# Patient Record
Sex: MALE | Race: ASIAN | NOT HISPANIC OR LATINO | URBAN - METROPOLITAN AREA
[De-identification: names, ages, dates, MRNs, and addresses within clinical notes are randomized per-mention and may not be internally consistent; named-entity substitution may affect disease eponyms.]

---

## 2024-04-23 ENCOUNTER — EMERGENCY (EMERGENCY)
Facility: HOSPITAL | Age: 64
LOS: 1 days | Discharge: HOME | End: 2024-04-23
Attending: STUDENT IN AN ORGANIZED HEALTH CARE EDUCATION/TRAINING PROGRAM | Admitting: EMERGENCY MEDICINE
Payer: COMMERCIAL

## 2024-04-23 VITALS
OXYGEN SATURATION: 99 % | RESPIRATION RATE: 16 BRPM | SYSTOLIC BLOOD PRESSURE: 152 MMHG | HEART RATE: 82 BPM | DIASTOLIC BLOOD PRESSURE: 79 MMHG | TEMPERATURE: 98 F

## 2024-04-23 LAB
A1C WITH ESTIMATED AVERAGE GLUCOSE RESULT: 14.1 % — HIGH (ref 4–5.6)
A1C WITH ESTIMATED AVERAGE GLUCOSE RESULT: 14.9 % — HIGH (ref 4–5.6)
ALBUMIN SERPL ELPH-MCNC: 4.2 G/DL — SIGNIFICANT CHANGE UP (ref 3.3–5)
ALP SERPL-CCNC: 126 U/L — HIGH (ref 40–120)
ALT FLD-CCNC: 34 U/L — SIGNIFICANT CHANGE UP (ref 4–41)
ANION GAP SERPL CALC-SCNC: 11 MMOL/L — SIGNIFICANT CHANGE UP (ref 7–14)
ANION GAP SERPL CALC-SCNC: 14 MMOL/L — SIGNIFICANT CHANGE UP (ref 7–14)
APPEARANCE UR: CLEAR — SIGNIFICANT CHANGE UP
AST SERPL-CCNC: 19 U/L — SIGNIFICANT CHANGE UP (ref 4–40)
B-OH-BUTYR SERPL-SCNC: <0 MMOL/L — SIGNIFICANT CHANGE UP (ref 0–0.4)
BACTERIA # UR AUTO: NEGATIVE /HPF — SIGNIFICANT CHANGE UP
BASE EXCESS BLDV CALC-SCNC: -0.5 MMOL/L — SIGNIFICANT CHANGE UP (ref -2–3)
BASOPHILS # BLD AUTO: 0.03 K/UL — SIGNIFICANT CHANGE UP (ref 0–0.2)
BASOPHILS NFR BLD AUTO: 0.5 % — SIGNIFICANT CHANGE UP (ref 0–2)
BILIRUB SERPL-MCNC: 0.3 MG/DL — SIGNIFICANT CHANGE UP (ref 0.2–1.2)
BILIRUB UR-MCNC: NEGATIVE — SIGNIFICANT CHANGE UP
BLOOD GAS VENOUS COMPREHENSIVE RESULT: SIGNIFICANT CHANGE UP
BUN SERPL-MCNC: 15 MG/DL — SIGNIFICANT CHANGE UP (ref 7–23)
BUN SERPL-MCNC: 17 MG/DL — SIGNIFICANT CHANGE UP (ref 7–23)
CALCIUM SERPL-MCNC: 8.3 MG/DL — LOW (ref 8.4–10.5)
CALCIUM SERPL-MCNC: 9.1 MG/DL — SIGNIFICANT CHANGE UP (ref 8.4–10.5)
CARBAMAZEPINE SERPL-MCNC: 6.4 UG/ML — SIGNIFICANT CHANGE UP (ref 4–12)
CAST: 0 /LPF — SIGNIFICANT CHANGE UP (ref 0–4)
CHLORIDE BLDV-SCNC: 99 MMOL/L — SIGNIFICANT CHANGE UP (ref 96–108)
CHLORIDE SERPL-SCNC: 100 MMOL/L — SIGNIFICANT CHANGE UP (ref 98–107)
CHLORIDE SERPL-SCNC: 94 MMOL/L — LOW (ref 98–107)
CO2 BLDV-SCNC: 28.3 MMOL/L — HIGH (ref 22–26)
CO2 SERPL-SCNC: 22 MMOL/L — SIGNIFICANT CHANGE UP (ref 22–31)
CO2 SERPL-SCNC: 23 MMOL/L — SIGNIFICANT CHANGE UP (ref 22–31)
COLOR SPEC: YELLOW — SIGNIFICANT CHANGE UP
CREAT SERPL-MCNC: 0.66 MG/DL — SIGNIFICANT CHANGE UP (ref 0.5–1.3)
CREAT SERPL-MCNC: 0.68 MG/DL — SIGNIFICANT CHANGE UP (ref 0.5–1.3)
DIFF PNL FLD: NEGATIVE — SIGNIFICANT CHANGE UP
EGFR: 104 ML/MIN/1.73M2 — SIGNIFICANT CHANGE UP
EGFR: 105 ML/MIN/1.73M2 — SIGNIFICANT CHANGE UP
EOSINOPHIL # BLD AUTO: 0.06 K/UL — SIGNIFICANT CHANGE UP (ref 0–0.5)
EOSINOPHIL NFR BLD AUTO: 1 % — SIGNIFICANT CHANGE UP (ref 0–6)
ESTIMATED AVERAGE GLUCOSE: 358 — SIGNIFICANT CHANGE UP
ESTIMATED AVERAGE GLUCOSE: 381 — SIGNIFICANT CHANGE UP
GAS PNL BLDV: 131 MMOL/L — LOW (ref 136–145)
GLUCOSE BLDV-MCNC: 276 MG/DL — HIGH (ref 70–99)
GLUCOSE SERPL-MCNC: 264 MG/DL — HIGH (ref 70–99)
GLUCOSE SERPL-MCNC: 406 MG/DL — HIGH (ref 70–99)
GLUCOSE UR QL: >=1000 MG/DL
HCO3 BLDV-SCNC: 27 MMOL/L — SIGNIFICANT CHANGE UP (ref 22–29)
HCT VFR BLD CALC: 44.1 % — SIGNIFICANT CHANGE UP (ref 39–50)
HCT VFR BLDA CALC: 41 % — SIGNIFICANT CHANGE UP (ref 39–51)
HGB BLD CALC-MCNC: 13.8 G/DL — SIGNIFICANT CHANGE UP (ref 12.6–17.4)
HGB BLD-MCNC: 14.6 G/DL — SIGNIFICANT CHANGE UP (ref 13–17)
IANC: 3.59 K/UL — SIGNIFICANT CHANGE UP (ref 1.8–7.4)
IMM GRANULOCYTES NFR BLD AUTO: 0.3 % — SIGNIFICANT CHANGE UP (ref 0–0.9)
KETONES UR-MCNC: NEGATIVE MG/DL — SIGNIFICANT CHANGE UP
LACTATE BLDV-MCNC: 1.5 MMOL/L — SIGNIFICANT CHANGE UP (ref 0.5–2)
LEUKOCYTE ESTERASE UR-ACNC: NEGATIVE — SIGNIFICANT CHANGE UP
LYMPHOCYTES # BLD AUTO: 1.93 K/UL — SIGNIFICANT CHANGE UP (ref 1–3.3)
LYMPHOCYTES # BLD AUTO: 32.7 % — SIGNIFICANT CHANGE UP (ref 13–44)
MCHC RBC-ENTMCNC: 28.5 PG — SIGNIFICANT CHANGE UP (ref 27–34)
MCHC RBC-ENTMCNC: 33.1 GM/DL — SIGNIFICANT CHANGE UP (ref 32–36)
MCV RBC AUTO: 86 FL — SIGNIFICANT CHANGE UP (ref 80–100)
MONOCYTES # BLD AUTO: 0.28 K/UL — SIGNIFICANT CHANGE UP (ref 0–0.9)
MONOCYTES NFR BLD AUTO: 4.7 % — SIGNIFICANT CHANGE UP (ref 2–14)
NEUTROPHILS # BLD AUTO: 3.59 K/UL — SIGNIFICANT CHANGE UP (ref 1.8–7.4)
NEUTROPHILS NFR BLD AUTO: 60.8 % — SIGNIFICANT CHANGE UP (ref 43–77)
NITRITE UR-MCNC: NEGATIVE — SIGNIFICANT CHANGE UP
NRBC # BLD: 0 /100 WBCS — SIGNIFICANT CHANGE UP (ref 0–0)
NRBC # FLD: 0.02 K/UL — HIGH (ref 0–0)
PCO2 BLDV: 53 MMHG — SIGNIFICANT CHANGE UP (ref 42–55)
PH BLDV: 7.31 — LOW (ref 7.32–7.43)
PH UR: 7 — SIGNIFICANT CHANGE UP (ref 5–8)
PLATELET # BLD AUTO: 298 K/UL — SIGNIFICANT CHANGE UP (ref 150–400)
PO2 BLDV: 23 MMHG — LOW (ref 25–45)
POTASSIUM BLDV-SCNC: 4.6 MMOL/L — SIGNIFICANT CHANGE UP (ref 3.5–5.1)
POTASSIUM SERPL-MCNC: 4.6 MMOL/L — SIGNIFICANT CHANGE UP (ref 3.5–5.3)
POTASSIUM SERPL-MCNC: 5.2 MMOL/L — SIGNIFICANT CHANGE UP (ref 3.5–5.3)
POTASSIUM SERPL-SCNC: 4.6 MMOL/L — SIGNIFICANT CHANGE UP (ref 3.5–5.3)
POTASSIUM SERPL-SCNC: 5.2 MMOL/L — SIGNIFICANT CHANGE UP (ref 3.5–5.3)
PROT SERPL-MCNC: 7.1 G/DL — SIGNIFICANT CHANGE UP (ref 6–8.3)
PROT UR-MCNC: NEGATIVE MG/DL — SIGNIFICANT CHANGE UP
RBC # BLD: 5.13 M/UL — SIGNIFICANT CHANGE UP (ref 4.2–5.8)
RBC # FLD: 11.4 % — SIGNIFICANT CHANGE UP (ref 10.3–14.5)
RBC CASTS # UR COMP ASSIST: 0 /HPF — SIGNIFICANT CHANGE UP (ref 0–4)
SAO2 % BLDV: 29.2 % — LOW (ref 67–88)
SODIUM SERPL-SCNC: 130 MMOL/L — LOW (ref 135–145)
SODIUM SERPL-SCNC: 134 MMOL/L — LOW (ref 135–145)
SP GR SPEC: 1.03 — HIGH (ref 1–1.03)
SQUAMOUS # UR AUTO: 0 /HPF — SIGNIFICANT CHANGE UP (ref 0–5)
T4 FREE SERPL-MCNC: 1 NG/DL — SIGNIFICANT CHANGE UP (ref 0.9–1.8)
TSH SERPL-MCNC: 1.2 UIU/ML — SIGNIFICANT CHANGE UP (ref 0.27–4.2)
UROBILINOGEN FLD QL: 0.2 MG/DL — SIGNIFICANT CHANGE UP (ref 0.2–1)
WBC # BLD: 5.91 K/UL — SIGNIFICANT CHANGE UP (ref 3.8–10.5)
WBC # FLD AUTO: 5.91 K/UL — SIGNIFICANT CHANGE UP (ref 3.8–10.5)
WBC UR QL: 0 /HPF — SIGNIFICANT CHANGE UP (ref 0–5)

## 2024-04-23 PROCEDURE — 70498 CT ANGIOGRAPHY NECK: CPT | Mod: 26,MC

## 2024-04-23 PROCEDURE — 93010 ELECTROCARDIOGRAM REPORT: CPT

## 2024-04-23 PROCEDURE — 99223 1ST HOSP IP/OBS HIGH 75: CPT

## 2024-04-23 PROCEDURE — 70496 CT ANGIOGRAPHY HEAD: CPT | Mod: 26,MC

## 2024-04-23 RX ORDER — SODIUM CHLORIDE 9 MG/ML
1000 INJECTION INTRAMUSCULAR; INTRAVENOUS; SUBCUTANEOUS ONCE
Refills: 0 | Status: COMPLETED | OUTPATIENT
Start: 2024-04-23 | End: 2024-04-23

## 2024-04-23 RX ORDER — INSULIN LISPRO 100/ML
5 VIAL (ML) SUBCUTANEOUS
Refills: 0 | Status: DISCONTINUED | OUTPATIENT
Start: 2024-04-24 | End: 2024-04-26

## 2024-04-23 RX ORDER — SODIUM CHLORIDE 9 MG/ML
1000 INJECTION, SOLUTION INTRAVENOUS
Refills: 0 | Status: DISCONTINUED | OUTPATIENT
Start: 2024-04-23 | End: 2024-04-26

## 2024-04-23 RX ORDER — SODIUM CHLORIDE 9 MG/ML
1000 INJECTION, SOLUTION INTRAVENOUS ONCE
Refills: 0 | Status: COMPLETED | OUTPATIENT
Start: 2024-04-23 | End: 2024-04-23

## 2024-04-23 RX ORDER — DEXTROSE 50 % IN WATER 50 %
12.5 SYRINGE (ML) INTRAVENOUS ONCE
Refills: 0 | Status: DISCONTINUED | OUTPATIENT
Start: 2024-04-23 | End: 2024-04-26

## 2024-04-23 RX ORDER — ACETAMINOPHEN 500 MG
650 TABLET ORAL ONCE
Refills: 0 | Status: COMPLETED | OUTPATIENT
Start: 2024-04-23 | End: 2024-04-23

## 2024-04-23 RX ORDER — GLUCAGON INJECTION, SOLUTION 0.5 MG/.1ML
1 INJECTION, SOLUTION SUBCUTANEOUS ONCE
Refills: 0 | Status: DISCONTINUED | OUTPATIENT
Start: 2024-04-23 | End: 2024-04-26

## 2024-04-23 RX ORDER — DEXTROSE 50 % IN WATER 50 %
25 SYRINGE (ML) INTRAVENOUS ONCE
Refills: 0 | Status: DISCONTINUED | OUTPATIENT
Start: 2024-04-23 | End: 2024-04-26

## 2024-04-23 RX ORDER — CARBAMAZEPINE 200 MG
400 TABLET ORAL DAILY
Refills: 0 | Status: DISCONTINUED | OUTPATIENT
Start: 2024-04-23 | End: 2024-04-26

## 2024-04-23 RX ORDER — INSULIN GLARGINE 100 [IU]/ML
21 INJECTION, SOLUTION SUBCUTANEOUS AT BEDTIME
Refills: 0 | Status: DISCONTINUED | OUTPATIENT
Start: 2024-04-23 | End: 2024-04-26

## 2024-04-23 RX ORDER — INSULIN LISPRO 100/ML
VIAL (ML) SUBCUTANEOUS
Refills: 0 | Status: DISCONTINUED | OUTPATIENT
Start: 2024-04-23 | End: 2024-04-26

## 2024-04-23 RX ORDER — MECLIZINE HCL 12.5 MG
25 TABLET ORAL EVERY 8 HOURS
Refills: 0 | Status: DISCONTINUED | OUTPATIENT
Start: 2024-04-23 | End: 2024-04-26

## 2024-04-23 RX ORDER — MECLIZINE HCL 12.5 MG
25 TABLET ORAL ONCE
Refills: 0 | Status: COMPLETED | OUTPATIENT
Start: 2024-04-23 | End: 2024-04-23

## 2024-04-23 RX ORDER — DEXTROSE 10 % IN WATER 10 %
125 INTRAVENOUS SOLUTION INTRAVENOUS ONCE
Refills: 0 | Status: DISCONTINUED | OUTPATIENT
Start: 2024-04-23 | End: 2024-04-26

## 2024-04-23 RX ORDER — DEXTROSE 50 % IN WATER 50 %
15 SYRINGE (ML) INTRAVENOUS ONCE
Refills: 0 | Status: DISCONTINUED | OUTPATIENT
Start: 2024-04-23 | End: 2024-04-26

## 2024-04-23 RX ADMIN — SODIUM CHLORIDE 1000 MILLILITER(S): 9 INJECTION, SOLUTION INTRAVENOUS at 21:31

## 2024-04-23 RX ADMIN — Medication 25 MILLIGRAM(S): at 12:13

## 2024-04-23 RX ADMIN — Medication 650 MILLIGRAM(S): at 12:13

## 2024-04-23 RX ADMIN — SODIUM CHLORIDE 1000 MILLILITER(S): 9 INJECTION INTRAMUSCULAR; INTRAVENOUS; SUBCUTANEOUS at 12:15

## 2024-04-23 RX ADMIN — SODIUM CHLORIDE 1000 MILLILITER(S): 9 INJECTION INTRAMUSCULAR; INTRAVENOUS; SUBCUTANEOUS at 14:00

## 2024-04-23 RX ADMIN — INSULIN GLARGINE 21 UNIT(S): 100 INJECTION, SOLUTION SUBCUTANEOUS at 22:12

## 2024-04-23 RX ADMIN — Medication 650 MILLIGRAM(S): at 13:52

## 2024-04-23 NOTE — ED CDU PROVIDER INITIAL DAY NOTE - ATTENDING APP SHARED VISIT CONTRIBUTION OF CARE
I have evaluated the patient and agree with the documentation and assessment as made by the PA. We have discussed plan of care and work up for the patient.    Exam:    GEN:  Non-toxic appearing, non-distressed, speaking full sentences, non-diaphoretic, AAOx3  HEENT:  NCAT, neck supple, EOMI, PERRLA, sclera anicteric, no conjunctival pallor or injection, no stridor, normal voice, no tonsillar exudate, uvula midline  CV:  regular rhythm and rate, s1/s2 audible, no murmurs, rubs or gallops, peripheral pulses 2+ and symmetric  PULM:  non-labored respirations, lungs clear to auscultation bilaterally, no wheezes, crackles or rales  ABD:  non distended, non-tender, no rebound, no guarding, negative Escalante's sign, bowel sounds normal, no cvat  MSK:  no gross deformity, non-tender extremities and joints, range of motion grossly normal appearing, no extremity edema, extremities warm and well perfused   NEURO:  AAOx3, CN II-XII intact, motor 5/5 in upper and lower extremities bilaterally, sensation grossly intact in extremities and trunk, finger to nose testing wnl, no nystagmus, negative Romberg, no pronator drift, no gait deficit  SKIN:  warm, dry, no rash or vesicles

## 2024-04-23 NOTE — ED ADULT TRIAGE NOTE - CHIEF COMPLAINT QUOTE
Pt history of seizure(takes Tegretol), presents for headache/dizziness starting yesterday, + strength/sensation to all 4 extremities, no weakness, denies chest pain, no shortness of breath, afebrile.

## 2024-04-23 NOTE — ED ADULT NURSE NOTE - CHIEF COMPLAINT
Needs a follow-up with Dr Isiah Islas has not been seen since Karena  The patient is a 64y Male complaining of

## 2024-04-23 NOTE — CONSULT NOTE ADULT - ATTENDING COMMENTS
Vertigo, isolated.  Neuro ROS is otherwise negative.  No corrective saccade with head impulse test.  No nystagmus.  No skew deviation.   No ataxia - FNF, KATERIN, HKS  Gait - normal heel, toe, tandem.    < from: MR Head No Cont (04.24.24 @ 01:35) >    IMPRESSION:  1.  No MRI evidence of acute intracranial pathology.  2.  Mild age-related involutional changes.  3.  Moderate patchy chronic microvascular ischemic disease.  4.  An empty versus partially empty sella is incidentally noted.  5.  Patchy paranasal sinus mucosal thickening without air-fluid level.    < end of copied text >        A/P  Mr. Hernandez is a 63 yo man with peripheral vertigo.  PRN meclizine  Outpatient neurology f/u - [phone].   [facility] vestibular rehabilitation - [phone]   Please provider educational resource: [email or web address]   Neurology signing off. Please reconsult PRN or call [facility] [phone] with any questions.   Thank you

## 2024-04-23 NOTE — ED PROVIDER NOTE - ATTENDING APP SHARED VISIT CONTRIBUTION OF CARE
I performed a history and physical exam of the patient and discussed their management with the resident/fellow/ACP/student. I have reviewed the resident/fellow/ACP/student note and agree with the documented findings and plan of care, except as noted. I have personally performed a substantive portion of the visit including all aspects of the medical decision making. My medical decision making and observations are found above. Please refer to any progress notes for updates on clinical course.    64-year-old male with past medical history of type 2 diabetes on metformin, seizure disorder (last seizure 3 months ago, compliant with medications currently on Tegretol) presenting with dizziness. Patient reports dizziness started around 11 AM yesterday while he was at work, worse with movement, associated posterior headache/neck pain. Dizziness worse with standing, cannot recall any fall/trauma.  Denies any change in vision/hearing, focal weakness, numbness/tingling, chest pain, shortness of breath, fever/chills, N/V/D, cough, rashes, or changes in urination.    Gen: WDWN, NAD, comfortable appearing, afebrile   HEENT: PERRLA, EOMI, no nasal discharge, mucous membranes moist  CV: RRR, +S1/S2, no M/R/G, equal b/l radial pulses 2+  Resp: CTAB, no W/R/R, SPO2 >95% on RA, no increased WOB   GI: Abdomen soft non-distended, NTTP, no masses/organomegaly   MSK/Skin: No CVA tenderness, no open wounds, no bruising, no LE edema  Neuro: CN2-12 grossly intact, A&Ox4, MS +5/5 in UE and LE BL, finger to nose smooth and rapid, gross sensation intact in UE and LE BL, gait smooth and coordinated, negative rhomberg, negative pronator drift   Psych: appropriate mood    MDM:   64-year-old male with past medical history of type 2 diabetes on metformin, seizure disorder (last seizure 3 months ago, compliant with medications currently on Tegretol) presenting with dizziness, Symptoms starting around 24 hours ago, no reported fall/trauma, no infectious symptoms, afebrile, no chest pain/shortness of breath, no FND on exam, no evidence of dysdiadochokinesia.  Exam/history concerning for but not limited to BPPV/peripheral vertigo versus low suspicion for central etiology of vertigo: Posterior CVA. Will evaluate with CTA head/neck, basic labs, symptom control, and reassess symptoms.  If patient persistently dizzy will consider keeping patient in observation unit for MRI

## 2024-04-23 NOTE — ED PROVIDER NOTE - PHYSICAL EXAMINATION
See Attestation See Attestation      CONSTITUTIONAL: Well-appearing; well-nourished; in no apparent distress. Non-toxic appearing.   HEAD: Normocephalic, atraumatic.  EYES: PERRL, EOM intact, conjunctiva and sclera WNL.  ENT: normal nose; no rhinorrhea; normal pharynx with no tonsillar hypertrophy, no erythema, no exudate, no lymphadenopathy.  NECK/LYMPH: Supple; non-tender; no cervical lymphadenopathy.  CARD: Normal S1, S2; no murmurs, rubs, or gallops noted.  RESP: Normal chest excursion with respiration; breath sounds clear and equal bilaterally; no wheezes, rhonchi, or rales.  ABD/GI: soft, non-distended; non-tender; no palpable organomegaly, no pulsatile mass.  EXT/MS: moves all extremities; distal pulses are normal, no pedal edema.  SKIN: Normal for age and race; warm; dry; good turgor; no apparent lesions or exudate noted.  NEURO: Awake, alert, oriented x 3, no gross deficits, CN II-XII grossly intact, no motor or sensory deficit noted. See Attestation      CONSTITUTIONAL: Well-appearing; well-nourished; in no apparent distress. Non-toxic appearing.   HEAD: Normocephalic, atraumatic.  EYES: PERRL, EOM intact, conjunctiva and sclera WNL.  ENT: normal nose; no rhinorrhea; normal pharynx with no tonsillar hypertrophy, no erythema, no exudate, no lymphadenopathy.  NECK/LYMPH: Supple; non-tender; no cervical lymphadenopathy.  CARD: Normal S1, S2; no murmurs, rubs, or gallops noted.  RESP: Normal chest excursion with respiration; breath sounds clear and equal bilaterally; no wheezes, rhonchi, or rales.  ABD/GI: soft, non-distended; non-tender; no palpable organomegaly, no pulsatile mass.  EXT/MS: moves all extremities; distal pulses are normal, no pedal edema.  SKIN: Normal for age and race; warm; dry; good turgor; no apparent lesions or exudate noted.  NEURO: Awake, alert, oriented x 3, no gross deficits, CN II-XII grossly intact, no motor or sensory deficit noted. FTN intact, no drift, ambulatory, negative rhomberg

## 2024-04-23 NOTE — ED ADULT NURSE NOTE - IN THE PAST 12 MONTHS HAVE YOU USED DRUGS OTHER THAN THOSE REQUIRED FOR MEDICAL REASON?
from [facility]  POD#2  plan for HD Sat 11/12   PT reccs: ANGELA   will likely return to [facility] once cleared by vascular   COVID - 11/8 No

## 2024-04-23 NOTE — ED CDU PROVIDER INITIAL DAY NOTE - OBJECTIVE STATEMENT
64-year-old male with past medical history of type 2 diabetes on metformin and 18 Units of insulin QHS, seizure disorder (last seizure 3 months ago, compliant with medications currently on Tegretol) presents to the ER complaining of giddiness, dizziness that began yesterday at 11 AM while he was at work, reported after he started to feel posterior head pain and neck pain. Dizziness worse with standing, but not with moving/ turning head. 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  Denies head strike or trauma.  Denies fevers,  chills, visual changes, hearing changes, nausea, vomiting, chest pain, shortness of breath, abdominal pain, urinary symptoms, weakness, numbness, tingling. Pt denies any other sx or acute complaints. Pt was seen by Neurology in the ED, recommends MRI for further eval, 64-year-old male with past medical history of type 2 diabetes on metformin and 18 Units of insulin QHS, seizure disorder (last seizure 3 months ago, compliant with medications currently on Tegretol) presents to the ER complaining of giddiness, dizziness that began yesterday at 11 AM while he was at work, reported after he started to feel posterior head pain and neck pain. Dizziness worse with standing, but not with moving/ turning head. 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  Denies head strike or trauma.  Denies fevers,  chills, visual changes, hearing changes, nausea, vomiting, chest pain, shortness of breath, abdominal pain, urinary symptoms, weakness, numbness, tingling. Pt denies any other sx or acute complaints. Pt was seen by Neurology in the ED, recommends MRI for further eval which was ordered. Endocrinology consulted due to uncontrolled DM. Pt has been stable while in CDU, no acute distress.

## 2024-04-23 NOTE — CONSULT NOTE ADULT - ASSESSMENT
Mr. Hernandez is a 64 year-old RH man with a PMH of DM2 and reported seizure disorder (on Tegretol 500mg qd) who presented to the ED on 4/23/24 with c/o intermittent dizziness of 2 weeks duration, described as both room spinning and instability, exacerbated by positional changes, for which Neurology was consulted. Vital signs notable for /79 on presentation, otherwise wnl. On evaluation, patient reports provocation of dizziness when moving from lying to sitting and standing. Exam without localizing deficits. Initial labs notable for Na+ 130. CTH/CTA with atretic R vertebral artery but no evidence of acute intracranial pathology or flow limiting stenosis or occlusion.   NIHSS: 0  pre-MRS: 0    Impression: Acute to subacute vestibular syndrome likely peripheral in etiology, possibly secondary to BPPV vs due to toxic-metabolic causes (eg hyponatremia, carbamazepine). Rule out central etiology such as a posterior circulation stroke.     Recommendations:   [] MRI brain w/o contrast  [] Meclizine 12.5mg to 25mg BID to TID prn; avoid prolonged use of vestibular suppressants  [] Can also try clonazepam 0.5mg BID to TID or valium 5mg BID if no improvement and no contraindication  [] Ensure adequate fluid intake  [] Replace electrolytes as appropriate  [] Check orthostatics, EKG  [] Check carbamazepine level  [] HbA1C and Lipid Panel  [] UA, TSH/fT4  [] Further infectious/toxic-metabolic workup as per primary team    Other:  [] Telemetry monitoring, Neurochecks/VS per unit protocol  [] Normotension  [] BG goal <180, avoid hypoglycemia  [] NPO until clears dysphagia screen, otherwise swallow evaluation  [] Fall precautions  [] DVT ppx      Patient will be formally staffed and seen with neurology attending during AM rounds.

## 2024-04-23 NOTE — ED PROVIDER NOTE - OBJECTIVE STATEMENT
64-year-old male with past medical history of type 2 diabetes on metformin, seizure disorder (last seizure 3 months ago, compliant with medications currently on Tegretol) presents to the ER complaining of giddiness, dizziness that began yesterday at 11 AM while he was at work, reported after he started to feel posterior head pain and neck pain. Dizziness worse with standing, but not with moving/ turning head. 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  Denies head strike or trauma.  Denies fevers,  chills, visual changes, hearing changes, nausea, vomiting, chest pain, shortness of breath, abdominal pain, urinary symptoms, weakness, numbness, tingling.

## 2024-04-23 NOTE — ED ADULT NURSE NOTE - OBJECTIVE STATEMENT
ER pt coming with 2 days of dizziness and Occipital area HA, denies SOB, no CP, no blur vision, pt is AOx 3 ambulatory to BR, Hx. seizures, on CM with NSR, lungs clear, resp. 18-20b/min, Oxygen Sat. 99% RA,    Labs sent IV to right AC 20g angio cath place.      Kenia Reyes RN (facilitator)

## 2024-04-23 NOTE — ED CDU PROVIDER INITIAL DAY NOTE - CLINICAL SUMMARY MEDICAL DECISION MAKING FREE TEXT BOX
64-year-old male with past medical history of type 2 diabetes on metformin and 18 Units of insulin QHS, seizure disorder (last seizure 3 months ago, compliant with medications currently on Tegretol) presents to the ER complaining of giddiness, dizziness that began yesterday at 11 AM while he was at work, reported after he started to feel posterior head pain and neck pain. Dizziness worse with standing, but not with moving/ turning head. 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 64-year-old male with past medical history of type 2 diabetes on metformin and 18 Units of insulin QHS, seizure disorder (last seizure 3 months ago, compliant with medications currently on Tegretol) presents to the ER complaining of giddiness, dizziness that began yesterday at 11 AM while he was at work, reported after he started to feel posterior head pain and neck pain. Dizziness worse with standing, but not with moving/ turning head.  Plan is CDU placement for glycemic control, MRI for further characterization of dizz and to R/O CVA or other central etiology, reassessment.

## 2024-04-23 NOTE — ED PROVIDER NOTE - CLINICAL SUMMARY MEDICAL DECISION MAKING FREE TEXT BOX
64-year-old male with past medical history of type 2 diabetes on metformin, seizure disorder (last seizure 3 months ago, compliant with medications currently on Tegretol) presents to the ER complaining of giddiness, dizziness that began yesterday at 11 AM while he was at work, reported after he started to feel posterior head pain and neck pain. Dizziness worse with standing, but not with moving/ turning head. 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  Denies head strike or trauma.  Denies fevers,  chills, visual changes, hearing changes, nausea, vomiting, chest pain, shortness of breath, abdominal pain, urinary symptoms, weakness, numbness, tingling.     most likely consistent with BPP vertigo, cervicogenic pain.   will get CTA head and neck eval posterior circ.   treat with fluids/ meclizine/ tylenol reassess. 64-year-old male with past medical history of type 2 diabetes on metformin, seizure disorder (last seizure 3 months ago, compliant with medications currently on Tegretol) presents to the ER complaining of giddiness, dizziness that began yesterday at 11 AM while he was at work, reported after he started to feel posterior head pain and neck pain. Dizziness worse with standing, but not with moving/ turning head. 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  Denies head strike or trauma.  Denies fevers,  chills, visual changes, hearing changes, nausea, vomiting, chest pain, shortness of breath, abdominal pain, urinary symptoms, weakness, numbness, tingling.     most likely consistent with BPP vertigo, cervicogenic pain.   will get CTA head and neck eval posterior circ.   treat with fluids/ meclizine/ tylenol reassess.    Deo, Attending  See Attestation

## 2024-04-23 NOTE — ED ADULT NURSE NOTE - NSFALLUNIVINTERV_ED_ALL_ED
Bed/Stretcher in lowest position, wheels locked, appropriate side rails in place/Call bell, personal items and telephone in reach/Instruct patient to call for assistance before getting out of bed/chair/stretcher/Non-slip footwear applied when patient is off stretcher/[place] to call system/Physically safe environment - no spills, clutter or unnecessary equipment/Purposeful proactive rounding/Room/bathroom lighting operational, light cord in reach

## 2024-04-23 NOTE — CONSULT NOTE ADULT - SUBJECTIVE AND OBJECTIVE BOX
Neurology Consult    JULISSA HERNANDEZ  64y Male  MRN-[id number]      HPI:    Mr. Hernandez is a 64 year-old RH man with a PMH of DM2 and reported seizure disorder (on Tegretol 500mg qd) who presented to the ED on 4/23/24 with c/o dizziness of 2 weeks duration, for which Neurology was consulted. Patient reports dizziness has been intermittent during this time, but was most severe the day prior to presentation. It is described as a sensation of feeling "shaky" and as though things are "moving around" him. He is uncertain of clear provoking factors, but reports worsening symptoms when he moves from laying to standing/ambulating and when improvement/resolution when laying down. He reports associated nausea, but has not experienced any vomiting. He does not report associated diplopia, dysarthria, tinnitus, numbness, tingling, or weakness of the extremities. He has not sustained any recent injuries or falls. No recent fever, chills or illness. At baseline he ambulates without assistance and is independent with ADLs.       A 10-system ROS was performed and is negative except as noted above and/or in the HPI.      PAST MEDICAL & SURGICAL HISTORY:  Diabetes      History of seizure      FAMILY HISTORY:  No reported pertinent history.      SOCIAL HISTORY:   No tobacco or alcohol use.   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    MEDICATIONS    Home Medications:    MEDICATIONS  (STANDING):  meclizine 25 milliGRAM(s) Oral Once    MEDICATIONS  (PRN):      Allergies  No Known Allergies        VITALS & EXAMINATION      Vital Signs Last 24 Hrs  T(C): 36.1 (23 Apr 2024 17:16), Max: 36.8 (23 Apr 2024 10:26)  T(F): 97 (23 Apr 2024 17:16), Max: 98.2 (23 Apr 2024 10:26)  HR: 85 (23 Apr 2024 17:16) (82 - 85)  BP: 158/91 (23 Apr 2024 17:16) (152/79 - 158/91)  RR: 16 (23 Apr 2024 17:16) (16 - 16)  SpO2: 100% (23 Apr 2024 17:16) (99% - 100%)    Parameters below as of 23 Apr 2024 10:26  Patient On (Oxygen Delivery Method): room air    General:  Constitutional: appears stated age, NAD.  Head: Normocephalic & atraumatic.  Respiratory: Breathing comfortably on room air.  Extremities: No cyanosis, clubbing, or edema.    Neurological (>12):  MS: Eyes open, alert, oriented to person, place, situation, time. Attn/Conc intact. Recent and remote memory intact. Fund of knowledge wnl. Follows all commands.    Speech/Language: Clear, fluent with good repetition. Naming intact.    CNs: PERRL (3 -> 2mm OU). Blink to threat intact b/l. EOMI, no nystagmus, no vertical skew, no diplopia. V1-3 intact to LT, well developed masseter muscles b/l. No facial asymmetry b/l, full eye closure strength b/l. Hearing grossly intact (rubbing fingers) b/l. Symmetric palate elevation in midline, no uvula deviation. Head turning & shoulder shrug intact b/l. Tongue midline on protrusion with full lateral movements.    Motor: Muscle bulk and tone are normal.  No tremor at rest. There is no pronator drift or satelliting on arm roll.    RUE/LUE: 5/5 shoulder abductors/, 5/5 elbow flexors/extensors, 5/5 .      RLE/LLE: 5/5 hip flexors, 5/5 knee flexors/extensors, 5/5 ankle dorsiflexors and plantarflexors.    Sensation: Intact to LT b/l throughout. Normal Romberg.     Reflexes: 2+ biceps, brachioradialis, triceps, patellars and achilles b/l.    Coordination: No dysmetria to FTN/HTS b/l.    Gait: Normal base, able to ambulate independently despite symptoms.        LABS:    CBC                       14.6   5.91  )-----------( 298      ( 23 Apr 2024 11:50 )             44.1     Chem 04-23    134<L>  |  100  |  15  ----------------------------<  264<H>  4.6   |  23  |  0.68    Ca    8.3<L>      23 Apr 2024 14:22    TPro  7.1  /  Alb  4.2  /  TBili  0.3  /  DBili  x   /  AST  19  /  ALT  34  /  AlkPhos  126<H>  04-23    Coags   LFTs LIVER FUNCTIONS - ( 23 Apr 2024 11:50 )  Alb: 4.2 g/dL / Pro: 7.1 g/dL / ALK PHOS: 126 U/L / ALT: 34 U/L / AST: 19 U/L / GGT: x           Lipid Panel   A1c   Cardiac enzymes     U/A Urinalysis Basic - ( 23 Apr 2024 14:22 )  Color: x / Appearance: x / SG: x / pH: x  Gluc: 264 mg/dL / Ketone: x  / Bili: x / Urobili: x   Blood: x / Protein: x / Nitrite: x   Leuk Esterase: x / RBC: x / WBC x   Sq Epi: x / Non Sq Epi: x / Bacteria: x        STUDIES & IMAGING    CT Angio Head/Neck w/ IV Cont (04.23.24 @ 12:54)  Parenchymal volume loss and chronic microvessel ischemic changes are   identified    There is no acute hemorrhage mass or mass effect seen.    Both distal common carotid, proximal internal and external carotid   arteries appear normal without significant stenosis. Atretic right   vertebral artery seen. Both vertebral arteries demonstrate enhancement.   Hypoplastic left A1 segment is seen. Elevation of the anterior cerebral   middle cerebral basilar and posterior cerebral arteries appear normal   without evidence of an aneurysm or significant stenosis.    The dural venous sinuses demonstrate normal enhancement.    Evaluation of the soft tissue neck region appears normal.    The visualized lung apices appear clear.    IMPRESSION: No evidence acute hemorrhage mass effect.    CTA of the neck demonstrates atretic right vertebral artery    CTA of the [place] demonstrates no evidence of aneurysms or   significant stenosis.    --- End of Report ---

## 2024-04-24 VITALS
TEMPERATURE: 98 F | DIASTOLIC BLOOD PRESSURE: 76 MMHG | RESPIRATION RATE: 16 BRPM | OXYGEN SATURATION: 97 % | SYSTOLIC BLOOD PRESSURE: 130 MMHG | HEART RATE: 89 BPM

## 2024-04-24 DIAGNOSIS — E78.5 HYPERLIPIDEMIA, UNSPECIFIED: ICD-10-CM

## 2024-04-24 DIAGNOSIS — I10 ESSENTIAL (PRIMARY) HYPERTENSION: ICD-10-CM

## 2024-04-24 DIAGNOSIS — E11.9 TYPE 2 DIABETES MELLITUS WITHOUT COMPLICATIONS: ICD-10-CM

## 2024-04-24 LAB
ALBUMIN SERPL ELPH-MCNC: 3.8 G/DL — SIGNIFICANT CHANGE UP (ref 3.3–5)
ALP SERPL-CCNC: 99 U/L — SIGNIFICANT CHANGE UP (ref 40–120)
ALT FLD-CCNC: 32 U/L — SIGNIFICANT CHANGE UP (ref 4–41)
ANION GAP SERPL CALC-SCNC: 14 MMOL/L — SIGNIFICANT CHANGE UP (ref 7–14)
AST SERPL-CCNC: 26 U/L — SIGNIFICANT CHANGE UP (ref 4–40)
BILIRUB SERPL-MCNC: 0.2 MG/DL — SIGNIFICANT CHANGE UP (ref 0.2–1.2)
BUN SERPL-MCNC: 16 MG/DL — SIGNIFICANT CHANGE UP (ref 7–23)
CALCIUM SERPL-MCNC: 8.8 MG/DL — SIGNIFICANT CHANGE UP (ref 8.4–10.5)
CHLORIDE SERPL-SCNC: 100 MMOL/L — SIGNIFICANT CHANGE UP (ref 98–107)
CO2 SERPL-SCNC: 21 MMOL/L — LOW (ref 22–31)
CREAT SERPL-MCNC: 0.64 MG/DL — SIGNIFICANT CHANGE UP (ref 0.5–1.3)
EGFR: 106 ML/MIN/1.73M2 — SIGNIFICANT CHANGE UP
GLUCOSE SERPL-MCNC: 262 MG/DL — HIGH (ref 70–99)
POTASSIUM SERPL-MCNC: 4.6 MMOL/L — SIGNIFICANT CHANGE UP (ref 3.5–5.3)
POTASSIUM SERPL-SCNC: 4.6 MMOL/L — SIGNIFICANT CHANGE UP (ref 3.5–5.3)
PROT SERPL-MCNC: 6.8 G/DL — SIGNIFICANT CHANGE UP (ref 6–8.3)
SODIUM SERPL-SCNC: 135 MMOL/L — SIGNIFICANT CHANGE UP (ref 135–145)

## 2024-04-24 PROCEDURE — 99238 HOSP IP/OBS DSCHRG MGMT 30/<: CPT

## 2024-04-24 PROCEDURE — 99283 EMERGENCY DEPT VISIT LOW MDM: CPT

## 2024-04-24 PROCEDURE — 99205 OFFICE O/P NEW HI 60 MIN: CPT | Mod: GC

## 2024-04-24 PROCEDURE — 70551 MRI BRAIN STEM W/O DYE: CPT | Mod: 26,MC

## 2024-04-24 RX ORDER — METFORMIN HYDROCHLORIDE 850 MG/1
1 TABLET ORAL
Qty: 120 | Refills: 0
Start: 2024-04-24 | End: 2024-06-22

## 2024-04-24 RX ORDER — INSULIN GLARGINE 100 [IU]/ML
15 INJECTION, SOLUTION SUBCUTANEOUS
Qty: 5 | Refills: 0
Start: 2024-04-24 | End: 2024-05-24

## 2024-04-24 RX ORDER — INSULIN GLARGINE 100 [IU]/ML
15 INJECTION, SOLUTION SUBCUTANEOUS
Qty: 5 | Refills: 0
Start: 2024-04-24 | End: 2024-05-23

## 2024-04-24 RX ORDER — ISOPROPYL ALCOHOL, BENZOCAINE .7; .06 ML/ML; ML/ML
0 SWAB TOPICAL
Qty: 100 | Refills: 1
Start: 2024-04-24

## 2024-04-24 RX ORDER — INSULIN LISPRO 100/ML
5 VIAL (ML) SUBCUTANEOUS
Qty: 5 | Refills: 0
Start: 2024-04-24 | End: 2024-05-23

## 2024-04-24 RX ADMIN — Medication 1: at 11:52

## 2024-04-24 RX ADMIN — Medication 5 UNIT(S): at 16:35

## 2024-04-24 RX ADMIN — Medication 5 UNIT(S): at 07:50

## 2024-04-24 RX ADMIN — Medication 2: at 07:51

## 2024-04-24 RX ADMIN — Medication 5 UNIT(S): at 11:52

## 2024-04-24 RX ADMIN — Medication 400 MILLIGRAM(S): at 12:02

## 2024-04-24 RX ADMIN — Medication 2: at 16:34

## 2024-04-24 NOTE — ED CDU PROVIDER DISPOSITION NOTE - NSFOLLOWUPINSTRUCTIONS_ED_ALL_ED_FT
Follow up with your PMD within 48-72 hrs or our clinic [phone].   Follow up with Endocrinology within the week.   [facility]: [address] [place] [address]. [address]. [place] [address]. Tel: [phone].  Call [phone] for appt.    Show copies of your reports given to you.     Start Lantus 15 units inject subcutaneously at bedtime.   Humalog 5 units inject before meals.      STOP glipizide-metformin and replace with metformin 1000mg BID  STOP victoza 1.8 mg sq daily for now     Please follow up outpatient with neurology.   You can call [phone] to make an appointment.   [facility] for vestibular rehabilitation - [phone]    Monitor you finger sticks 4x/day- before meals and at bedtime and record in a log book.    Bring that log book with you when you follow up with Endocrinology.    Blood sugar less than 60 or greater than 300 you should return to the ED.    Weakness, chest pain, nausea, vomiting, fever, chills, abdominal pain or ANY NEW CONCERNING SYMPTOMS return to the Emergency Department.       DIZZINESS, IMBALANCE AND HEARING DISORDERS  Can Land MD    Please read our disclaimer • Return to index. •Page last modified: July 1, 2023  This list is not always current and we advise using the search tool on this page and all of the pages on this site. The search tool is confined to this site, it doesn't have advertisements, and it has no bias regarding the position of pages.    Roller coaster Dizziness -- broadly  An overview of dizziness  Evaluation of the dizzy patient (for clinicians)  Dizziness in groups  Dizziness in the elderly  Children  Pilots  Unlocalized dizziness and ataxia  Mechanism of BPPV Dizziness or vertigo caused by inner ear problems  Benign Paroxysmal Positional Vertigo (BPPV)  Posterior canal BPPV  Lateral Canal BPPV  Controversies  Attila-Hallpike test  Recent articles with comments  Meniere's Disease  Epidemiology (distribution in the population)  Causes  diet  last_resort treatment  Alternative Treatments, placebo treatments.  Infections  HIV-1 infection  Otic tuberculosis  Otosyphilis  West Nile  Vestibular Neuritis and Labyrinthitis  Bilateral Vestibular Loss (including Gentamicin toxicity)  Causes of bilateral loss  Ototoxic drugs  Ototoxic ear drops  Prevention  Testing for bilateral vestibular loss  Pressure Sensitivity  Perilymph Fistula  Superior Canal Dehiscence  Tumors and other masses  Acoustic Neuroma  Glomus Tumor  Meningioma  subarachnoid cyst  Microvascular compression syndrome  Utricular Dysfunction     Brain Dizziness caused by Neurological Problems  Basilar invagination  Brainstem strokes (pathophysiology and presentation)  B12 deficiency  Cerebellar degenerations  Cervical vertigo  Chiari Malformation  Epilepsy  Lithium toxicity.  Low CSF pressure  Mal de debarquement (MDD)  Migraine associated vertigo  Motion sickness  Multiple Sclerosis (MS)  Parkinsonism and related disorders  Periventricular white matter lesions  Primary Orthostatic Tremor (POT)  Oculopalatal myoclonus  Opsoclonus  Stroke and Transient Ischemic Attack (diagnosis and treatment)  BP Cuff  Dizziness caused by low blood pressure, medication, intoxications, or chemicals  Alcohol intoxication  autonomic neuropathy  Medication induced dizziness  Orthostatic Hypotension  Positional orthostatic tachycardia (POTS)  Solvents  Syncope  Audiogram Hearing problems  Hearing loss (general discussion)  Autoimmune inner ear disease  Auditory neuropathy and carbon monoxide hearing loss  Acoustic Neuroma, cholesteatoma and Glomus tumor (see tumors page for others)  Conductive hearing loss, sensorineural hearing loss, noise induced hearing loss, central hearing loss, and presbyacusis (age related)  Low frequency sensorineural hearing loss and cookie-bite hearing loss  Menieres Disease  Otosclerosis  Otitis media and Otitis externa  Sudden hearing loss and Progressive bilateral hearing loss  Superficial siderosis  Tinnitus and Hyperacusis  Ear Wax  Dry or itchy ears  Miscellaneous -- mainly situational or particular symptoms of dizziness  Postraumatic syndromes  airbag deployment  Concussion  Ferguson syndrome  Inversion tables  Lightning and electrical injuries  Post-traumatic hearing loss  Post-traumatic Vertigo  Temporal bone fracture  Postpartum (pregnancy related) vertigo  PPPD (Persistent postural perceptual dizziness, a functional dizziness)  Radiation to ear  whiplash  Events or disorders with a tenuous connection to dizziness  Amyloidosis and dizziness/hearing  TMJ (temporomandibular joint) induced symptoms  Fibromyalgia  Dizziness associated with various activities  Dental work  Drinking alcohol  Exercise induced dizziness  Flying  Motion sickness  Positional vertigo  Pressure sensitivity  Sexual activity  Sound sensitivity (Tullio's)  Visual dependence and visual sensitivity (grocery store syndrome)  Symptoms  Rocking  Drop attacks  Fluctuations  Inversion illusions and Palinopsia (repeated images)  Room tilt illusions  Epley Maneuver Treatment of Vertigo  Medical treatment  For inner ear disorders (such as vestibular neuritis or Meniere's)  For central disorders (such as stroke or brain tumor)  Surgical Treatment (general -- there are a few operations)  Intratympanic gentamicin  Intratympanic steroids  Pollo Chi for balance   Vestibular Rehabilitation  Practice related material for the dizzy clinician  Bedside recording of eye movements  Drug treatment of dizziness  Evaluation of the dizzy patient  Eye movement testing Follow up with your PMD within 48-72 hrs or our clinic [phone].   Follow up with Endocrinology within the week.   [facility]: [address] [place] [address]. [address]. [place] [address]. Tel: [phone].  Call [phone] for appt.    Show copies of your reports given to you.     Start Lantus 15 units inject subcutaneously at bedtime.   Humalog 5 units inject before meals.    Metformin 1000mg twice a day.    STOP glipizide-metformin and replace with metformin 1000mg BID  STOP victoza 1.8 mg sq daily for now     Please follow up outpatient with neurology.   You can call [phone] to make an appointment.   [facility] for vestibular rehabilitation - [phone]    Monitor you finger sticks 4x/day- before meals and at bedtime and record in a log book.    Bring that log book with you when you follow up with Endocrinology.    Blood sugar less than 60 or greater than 300 you should return to the ED.    Weakness, chest pain, nausea, vomiting, fever, chills, abdominal pain or ANY NEW CONCERNING SYMPTOMS return to the Emergency Department.       DIZZINESS, IMBALANCE AND HEARING DISORDERS  Can Land MD    Please read our disclaimer • Return to index. •Page last modified: July 1, 2023  This list is not always current and we advise using the search tool on this page and all of the pages on this site. The search tool is confined to this site, it doesn't have advertisements, and it has no bias regarding the position of pages.    Roller coaster Dizziness -- broadly  An overview of dizziness  Evaluation of the dizzy patient (for clinicians)  Dizziness in groups  Dizziness in the elderly  Children  Pilots  Unlocalized dizziness and ataxia  Mechanism of BPPV Dizziness or vertigo caused by inner ear problems  Benign Paroxysmal Positional Vertigo (BPPV)  Posterior canal BPPV  Lateral Canal BPPV  Controversies  Attila-Hallpike test  Recent articles with comments  Meniere's Disease  Epidemiology (distribution in the population)  Causes  diet  last_resort treatment  Alternative Treatments, placebo treatments.  Infections  HIV-1 infection  Otic tuberculosis  Otosyphilis  West Nile  Vestibular Neuritis and Labyrinthitis  Bilateral Vestibular Loss (including Gentamicin toxicity)  Causes of bilateral loss  Ototoxic drugs  Ototoxic ear drops  Prevention  Testing for bilateral vestibular loss  Pressure Sensitivity  Perilymph Fistula  Superior Canal Dehiscence  Tumors and other masses  Acoustic Neuroma  Glomus Tumor  Meningioma  subarachnoid cyst  Microvascular compression syndrome  Utricular Dysfunction     Brain Dizziness caused by Neurological Problems  Basilar invagination  Brainstem strokes (pathophysiology and presentation)  B12 deficiency  Cerebellar degenerations  Cervical vertigo  Chiari Malformation  Epilepsy  Lithium toxicity.  Low CSF pressure  Mal de debarquement (MDD)  Migraine associated vertigo  Motion sickness  Multiple Sclerosis (MS)  Parkinsonism and related disorders  Periventricular white matter lesions  Primary Orthostatic Tremor (POT)  Oculopalatal myoclonus  Opsoclonus  Stroke and Transient Ischemic Attack (diagnosis and treatment)  BP Cuff  Dizziness caused by low blood pressure, medication, intoxications, or chemicals  Alcohol intoxication  autonomic neuropathy  Medication induced dizziness  Orthostatic Hypotension  Positional orthostatic tachycardia (POTS)  Solvents  Syncope  Audiogram Hearing problems  Hearing loss (general discussion)  Autoimmune inner ear disease  Auditory neuropathy and carbon monoxide hearing loss  Acoustic Neuroma, cholesteatoma and Glomus tumor (see tumors page for others)  Conductive hearing loss, sensorineural hearing loss, noise induced hearing loss, central hearing loss, and presbyacusis (age related)  Low frequency sensorineural hearing loss and cookie-bite hearing loss  Menieres Disease  Otosclerosis  Otitis media and Otitis externa  Sudden hearing loss and Progressive bilateral hearing loss  Superficial siderosis  Tinnitus and Hyperacusis  Ear Wax  Dry or itchy ears  Miscellaneous -- mainly situational or particular symptoms of dizziness  Postraumatic syndromes  airbag deployment  Concussion  Lindale syndrome  Inversion tables  Lightning and electrical injuries  Post-traumatic hearing loss  Post-traumatic Vertigo  Temporal bone fracture  Postpartum (pregnancy related) vertigo  PPPD (Persistent postural perceptual dizziness, a functional dizziness)  Radiation to ear  whiplash  Events or disorders with a tenuous connection to dizziness  Amyloidosis and dizziness/hearing  TMJ (temporomandibular joint) induced symptoms  Fibromyalgia  Dizziness associated with various activities  Dental work  Drinking alcohol  Exercise induced dizziness  Flying  Motion sickness  Positional vertigo  Pressure sensitivity  Sexual activity  Sound sensitivity (Tullio's)  Visual dependence and visual sensitivity (grocery store syndrome)  Symptoms  Rocking  Drop attacks  Fluctuations  Inversion illusions and Palinopsia (repeated images)  Room tilt illusions  Epley Maneuver Treatment of Vertigo  Medical treatment  For inner ear disorders (such as vestibular neuritis or Meniere's)  For central disorders (such as stroke or brain tumor)  Surgical Treatment (general -- there are a few operations)  Intratympanic gentamicin  Intratympanic steroids  Pollo Chi for balance   Vestibular Rehabilitation  Practice related material for the dizzy clinician  Bedside recording of eye movements  Drug treatment of dizziness  Evaluation of the dizzy patient  Eye movement testing

## 2024-04-24 NOTE — ED ADULT NURSE REASSESSMENT NOTE - NS ED NURSE REASSESS COMMENT FT1
Break coverage RN: Received patient in CDU bed 7. Patient denies any pain/discomfort at this time. Patient is A&OX4, airway patent, breathing unlabored and even, radial pulses palpable. Side rails up and safety maintained. Call bells within reach. Break coverage RN: Received patient in CDU bed 7. Patient denies any pain or discomfort at this time. Patient is A&OX4, airway patent, breathing unlabored and even, radial pulses palpable. Side rails up and safety maintained. Call bells within reach.

## 2024-04-24 NOTE — ED CDU PROVIDER SUBSEQUENT DAY NOTE - CLINICAL SUMMARY MEDICAL DECISION MAKING FREE TEXT BOX
64-year-old male with past medical history of type 2 diabetes on metformin and 18 Units of insulin QHS, seizure disorder (last seizure 3 months ago, compliant with medications currently on Tegretol) presents to the ER complaining of giddiness, dizziness that began yesterday at 11 AM while he was at work, reported after he started to feel posterior head pain and neck pain. Dizziness worse with standing, but not with moving/ turning head.  Plan is CDU placement for glycemic control, MRI for further characterization of dizz and to R/O CVA or other central etiology, reassessment. Pt has been stable while in CDU, will continue to monitor.

## 2024-04-24 NOTE — ED CDU PROVIDER SUBSEQUENT DAY NOTE - HISTORY
64-year-old male with past medical history of type 2 diabetes on metformin and 18 Units of insulin QHS, seizure disorder (last seizure 3 months ago, compliant with medications currently on Tegretol) presents to the ER complaining of giddiness, dizziness that began yesterday at 11 AM while he was at work, reported after he started to feel posterior head pain and neck pain. Dizziness worse with standing, but not with moving/ turning head. 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  Denies head strike or trauma.  Denies fevers,  chills, visual changes, hearing changes, nausea, vomiting, chest pain, shortness of breath, abdominal pain, urinary symptoms, weakness, numbness, tingling. Pt denies any other sx or acute complaints. Pt was seen by Neurology in the ED, recommends MRI for further eval which was ordered. Endocrinology consulted due to uncontrolled DM. Pt has been stable while in CDU, no acute distress, endocrinology consulted, will continue to monitor.

## 2024-04-24 NOTE — CONSULT NOTE ADULT - ASSESSMENT
The patient is a 64y Male with PMH of T2DM, HTN, HLD, seizure d/o, presenting with dizziness. Endocrinology consulted for uncontrolled T2DM.    incomplete note    #Uncontrolled Type 2 Diabetes Mellitus   - Follows with: PCP  - A1C with Estimated Average Glucose Result: 14.9 % (04-23-24)  - Home regimen: glipizide-metformin 5mg-500mg 2 tablets BID; victoza 1.8 mg sq daily. Not taking insulin as reported in other documentation.  - eGFR: 104 mL/min/1.73m2 (04-23-24)  - Weight (kg): 59.6 (04-23-24)  - glucose 400s on admission, no evidence of DKA on labs      INPATIENT PLAN:  - Recommend Lantus 18 units QHS   - Recommend Admelog 5 units TID before meals (HOLD if NPO or not eating)  - Recommend Low dose admelog correction scale TIDQAC and separate low dose scale QHS  - Please check FSG before meals and QHS, or q6h while NPO  - Inpatient glucose goals: <140 pre-meal, <180 random  - consistent carb diet when eating  - nutrition consult placed    DISCHARGE PLANNING:  - Basaglar Solostar Pen (or lantus Kwikpen): Inject XX units sc before bedtime, dispense 5 pens (3 refills).  If not covered, can try alternating with one of following: tresiba/basaglar/toujeo/Levemir.  - Humalog Kwikpen: Inject xx units TID before meals, dispense 5 pens (3 refills). Can try alternating with one of following if not covered: novolog/apidra/admelog/fiasp  - STOP glipizide-metformin and replace with metformin 1000mg BID  - c/w victoza 1.8 mg sq daily    - please send BD evangelista 4mm pen needles: dispense #100, use as directed (3 refills)  - Please also ensure patient has working glucometer, as well as enough lancets, test strips, and alcohol pads to check their BG multiple times per day.    - At home, Patient should check FSBG premeals and bedtime. Pt should call their doctor when FSBG <70 or above >400 and or consistently above 200s as changes in the regimen will have to be made  - will need endocrinology follow-up. Please provide clinic info in [place] paperwork: [facility]: [address] [place] [address]. [address]. [place] [address]. Tel: [phone]      #Hypertension  - Goal BP <130/80  - Management as per primary team  - check urine microalbumin level as outpatient    #Hyperlipidemia  - LDL goal <70  - would start moderate dose statin  - check lipid panel as outpatient on a yearly basis    Antonio Zelaya MD  Endocrine Fellow  For nonurgent matters (including consults or followup questions), please email [email or web address] or [email or web address]. For urgent matters, please call answering service at [phone] (weekdays), [phone] (nights/weekends).    The patient is a 64y Male with PMH of T2DM, HTN, HLD, seizure d/o, presenting with dizziness. Endocrinology consulted for uncontrolled T2DM.    #Uncontrolled Type 2 Diabetes Mellitus   - Follows with: PCP  - A1C with Estimated Average Glucose Result: 14.9 % (04-23-24)  - Home regimen: glipizide-metformin 5mg-500mg 2 tablets BID; victoza 1.8 mg sq daily. Not taking insulin as reported in other documentation.  - eGFR: 104 mL/min/1.73m2 (04-23-24)  - Weight (kg): 59.6 (04-23-24)  - glucose 400s on admission, no evidence of DKA on labs      INPATIENT PLAN:  - Recommend Lantus 18 units QHS   - Recommend Admelog 5 units TID before meals (HOLD if NPO or not eating)  - Recommend Low dose admelog correction scale TIDQAC and separate low dose scale QHS  - Please check FSG before meals and QHS, or q6h while NPO  - Inpatient glucose goals: <140 pre-meal, <180 random  - consistent carb diet when eating  - nutrition consult placed    DISCHARGE PLANNING:  - Basaglar Solostar Pen (or lantus Kwikpen): Inject 15 units sc before bedtime, dispense 5 pens (3 refills).  If not covered, can try alternating with one of following: tresiba/basaglar/toujeo/Levemir.  - Humalog Kwikpen: Inject 5 units TID before meals, dispense 5 pens (3 refills). Can try alternating with one of following if not covered: novolog/apidra/admelog/fiasp  - STOP glipizide-metformin and replace with metformin 1000mg BID  - STOP victoza 1.8 mg sq daily    - please send BD evangelista 4mm pen needles: dispense #100, use as directed (3 refills)  - Please also ensure patient has working glucometer, as well as enough lancets, test strips, and alcohol pads to check their BG multiple times per day.    - At home, Patient should check FSBG premeals and bedtime. Pt should call their doctor when FSBG <70 or above >400 and or consistently above 200s as changes in the regimen will have to be made  - will need endocrinology follow-up. Please provide clinic info in [place] paperwork: [facility]: [address] [place] [address]. [address]. [place] [address]. Tel: [phone]      #Hypertension  - Goal BP <130/80  - Management as per primary team  - check urine microalbumin level as outpatient    #Hyperlipidemia  - LDL goal <70  - would start moderate dose statin  - check lipid panel as outpatient on a yearly basis    Antonio Zelaya MD  Endocrine Fellow  For nonurgent matters (including consults or followup questions), please email [email or web address] or [email or web address]. For urgent matters, please call answering service at [phone] (weekdays), [phone] (nights/weekends).    The patient is a 64y Male with PMH of T2DM, HTN, HLD, seizure d/o, presenting with dizziness. Endocrinology consulted for uncontrolled T2DM.    #Uncontrolled Type 2 Diabetes Mellitus   - Follows with: PCP  - A1C with Estimated Average Glucose Result: 14.9 % (04-23-24)  - Home regimen: glipizide-metformin 5mg-500mg 2 tablets BID; victoza 1.8 mg sq daily. Not taking insulin as reported in other documentation.  - eGFR: 104 mL/min/1.73m2 (04-23-24)  - Weight (kg): 59.6 (04-23-24)  - glucose 400s on admission, no evidence of DKA on labs      INPATIENT PLAN:  - Recommend Lantus 18 units QHS   - Recommend Admelog 5 units TID before meals (HOLD if NPO or not eating)  - Recommend Low dose admelog correction scale TIDQAC and separate low dose scale QHS  - Please check FSG before meals and QHS, or q6h while NPO  - Inpatient glucose goals: <140 pre-meal, <180 random  - consistent carb diet when eating  - nutrition consult placed  - will need RN insulin teaching - ordered    DISCHARGE PLANNING:  - Basaglar Solostar Pen (or lantus Kwikpen): Inject 15 units sc before bedtime, dispense 5 pens (3 refills).  If not covered, can try alternating with one of following: tresiba/basaglar/toujeo/Levemir.  - Humalog Kwikpen: Inject 5 units TID before meals, dispense 5 pens (3 refills). Can try alternating with one of following if not covered: novolog/apidra/admelog/fiasp  - STOP glipizide-metformin and replace with metformin 1000mg BID  - STOP victoza 1.8 mg sq daily    - please send BD evangelista 4mm pen needles: dispense #100, use as directed (3 refills)  - Please also ensure patient has working glucometer, as well as enough lancets, test strips, and alcohol pads to check their BG multiple times per day.    - At home, Patient should check FSBG premeals and bedtime. Pt should call their doctor when FSBG <70 or above >400 and or consistently above 200s as changes in the regimen will have to be made  - will need endocrinology follow-up. Please provide clinic info in [place] paperwork: [facility]: [address] [place] [address]. [address]. [place] [address]. Tel: [phone]      #Hypertension  - Goal BP <130/80  - Management as per primary team  - check urine microalbumin level as outpatient    #Hyperlipidemia  - LDL goal <70  - would start moderate dose statin  - check lipid panel as outpatient on a yearly basis    Antonio Zelaya MD  Endocrine Fellow  For nonurgent matters (including consults or followup questions), please email [email or web address] or [email or web address]. For urgent matters, please call answering service at [phone] (weekdays), [phone] (nights/weekends).    The patient is a 64y Male with PMH of T2DM, HTN, HLD, seizure d/o, presenting with dizziness. Endocrinology consulted for uncontrolled T2DM.    #Uncontrolled Type 2 Diabetes Mellitus   - Follows with: PCP  - A1C with Estimated Average Glucose Result: 14.9 % (04-23-24)  - Home regimen: glipizide-metformin 5mg-500mg 2 tablets BID; victoza 1.8 mg sq daily. Not taking insulin as reported in other documentation.  - eGFR: 104 mL/min/1.73m2 (04-23-24)  - Weight (kg): 59.6 (04-23-24)  - glucose 400s on admission, no evidence of DKA on labs      INPATIENT PLAN:  - Recommend Lantus 18 units QHS   - Recommend Admelog 5 units TID before meals (HOLD if NPO or not eating)  - Recommend Low dose admelog correction scale TIDQAC and separate low dose scale QHS  - Please check FSG before meals and QHS, or q6h while NPO  - Inpatient glucose goals: <140 pre-meal, <180 random  - consistent carb diet when eating  - nutrition consult placed  - will need RN insulin teaching - ordered    DISCHARGE PLANNING:  - Basaglar Solostar Pen (or lantus Kwikpen): Inject 15 units sc before bedtime, dispense 5 pens (3 refills).  If not covered, can try alternating with one of following: tresiba/basaglar/toujeo/Levemir.  - Humalog Kwikpen: Inject 5 units TID before meals, dispense 5 pens (3 refills). Can try alternating with one of following if not covered: novolog/apidra/admelog/fiasp  - STOP glipizide-metformin and replace with metformin 1000mg BID  - STOP victoza 1.8 mg sq daily    - please send BD evangelista 4mm pen needles: dispense #100, use as directed (3 refills)  - Please also ensure patient has working glucometer, as well as enough lancets, test strips, and alcohol pads to check their BG multiple times per day.    - At home, Patient should check FSBG premeals and bedtime. Pt should call their doctor when FSBG <70 or above >400 and or consistently above 200s as changes in the regimen will have to be made  - will need endocrinology follow-up. Please provide clinic info in [place] paperwork: [facility]: [address] [place] [address]. [address]. [place] [address]. Tel: [phone]      #Hypertension  - Goal BP <130/80  - Management as per primary team  - check urine microalbumin level as outpatient    #Hyperlipidemia  - LDL goal <70  - would start moderate dose statin  - check lipid panel as outpatient on a yearly basis    Recommendations communicated to primary team    Antonio Zelaya MD  Endocrine Fellow  For nonurgent matters (including consults or followup questions), please email [email or web address] or [email or web address]. For urgent matters, please call answering service at [phone] (weekdays), [phone] (nights/weekends).    The patient is a 64y Male with PMH of T2DM, HTN, HLD, seizure d/o, presenting with dizziness. Endocrinology consulted for uncontrolled T2DM.    #Uncontrolled Type 2 Diabetes Mellitus   - Follows with: PCP  - A1C with Estimated Average Glucose Result: 14.9 % (04-23-24)  - Home regimen: glipizide-metformin 5mg-500mg 2 tablets BID; victoza 1.8 mg sq daily. Not taking insulin as reported in other documentation.  - eGFR: 104 mL/min/1.73m2 (04-23-24)  - Weight (kg): 59.6 (04-23-24)  - glucose 400s on admission, no evidence of DKA on labs      INPATIENT PLAN:  - Recommend Lantus 18 units QHS   - Recommend Admelog 5 units TID before meals (HOLD if NPO or not eating)  - Recommend Low dose admelog correction scale TIDQAC and separate low dose scale QHS  - Please check FSG before meals and QHS, or q6h while NPO  - Inpatient glucose goals: <140 pre-meal, <180 random  - consistent carb diet when eating  - nutrition consult placed  - will need RN insulin teaching - ordered    DISCHARGE PLANNING:  - Basaglar Solostar Pen (or lantus Kwikpen): Inject 15 units sc before bedtime, dispense 5 pens (3 refills).  If not covered, can try alternating with one of following: tresiba/basaglar/toujeo/Levemir.  - Humalog Kwikpen: Inject 5 units TID before meals, dispense 5 pens (3 refills). Can try alternating with one of following if not covered: novolog/apidra/admelog/fiasp  - STOP glipizide-metformin and replace with metformin 1000mg BID  - STOP victoza 1.8 mg sq daily for now as we are starting basal bolus and to simplfy regimen, can reassess oupt     To prepare for dc:  -Discussed with patient the importance of Carb consistent diet and exercise as tolerated.    -Recommend nutritional consultation  inpt prior to dc   -Discussed glycemic goal of a1c <7% to prevent microvascular complications of diabetes mellitus and reduce the risk of macrovascular complications.  - Make sure patient knows how to inject insulin and check fingersticks with glucometer (ask bedside RN for teaching)  -Counseled pt on kinetics and action of basal and prandial insulin. Discussed that pt should check FSBG before meals and ALSO take the prandial insulin BEFORE meals   - Discharge on premeal insulin and Lantus. do not dc on correction scale or bedtime scale.  - At home, Patient should check FSBG premeals and bedtime. Pt should call their doctor when FSBG <70 or above >400 and or consistently above 200s as changes in the regimen will have to be made.  -pt should call PMD for DM related questions or concerns until pt is seen by CDE or endocrine   -Recommend annual podiatry and ophthalmology follow up.     -------------------------------------------------------------------------------------------------------------------------------------  DISCHARGE RX:  - Glucometer (ACCU_CHECK tessa Conenct, Ascensia Contour Next EZ or One, Freestyle Freedome LITE or OneTouch Verio IQ)  - Glucometer test strips and lancets  (make sure compatible w glucometer), Dispense #100 (or #200) use as directed  -  BD evangelista 4mm pen needles  Please send Lantus solsotar pen and humalog kwikpen as test script to check insurance coverage.  Ok to send with current doses and update prior to d/c    Lantus Solostar Pen ___ units before bedtime, dispense 15ml  - if not covered- can try alternating with one of following   tresiba/basaglar/toujeo/Levemir    Humalog Flexpen up to ___ dispense 15ml- can try alternating with one of following  if not covered try alternative: novolog/apidra/admelog/fiasp    Patient will also need a glucometer, test strips, lancets, alcohol pads,   if CGM is covered, can send freestyle josé manuel 2-3 for the pt and he can download Freestyle josé manuel tarun on phone to function as reader  Please also prescribe glucose tabs, Baqsimi nasal spray or glucagon emergency kit for hypoglycemia risk     --------------------------------------------------------------------------------------------------------------------------------------  - will need endocrinology follow-up. Please provide clinic info in [place] paperwork: [facility]: [address] [place] [address]. [address] [address]. Tel: [phone]      #Hypertension  - Goal BP <130/80  - Management as per primary team  - check urine microalbumin level as outpatient    #Hyperlipidemia  - LDL goal <70  - would start moderate dose statin  - check lipid panel as outpatient on a yearly basis    Recommendations communicated to primary team    Antonio Zelaya MD  Endocrine Fellow  For nonurgent matters (including consults or followup questions), please email [email or web address] or [email or web address]. For urgent matters, please call answering service at [phone] (weekdays), [phone] (nights/weekends).    The patient is a 64y Male with PMH of T2DM, HTN, HLD, seizure d/o, presenting with dizziness. Endocrinology consulted for uncontrolled T2DM.    #Uncontrolled Type 2 Diabetes Mellitus   - Follows with: PCP  - A1C with Estimated Average Glucose Result: 14.9 % (04-23-24)  - Home regimen: glipizide-metformin 5mg-500mg 2 tablets BID; victoza 1.8 mg sq daily. Not taking insulin as reported in other documentation.  - eGFR: 104 mL/min/1.73m2 (04-23-24)  - Weight (kg): 59.6 (04-23-24)  - glucose 400s on admission, no evidence of DKA on labs      INPATIENT PLAN:  - Recommend Lantus 18 units QHS   - Recommend Admelog 5 units TID before meals (HOLD if NPO or not eating)  - Recommend Low dose admelog correction scale TIDQAC and separate low dose scale QHS  - Please check FSG before meals and QHS, or q6h while NPO  - Inpatient glucose goals: <140 pre-meal, <180 random  - consistent carb diet when eating  - nutrition consult placed  - will need RN insulin teaching - ordered    DISCHARGE PLANNING:  - Basaglar Solostar Pen (or lantus Kwikpen): Inject 15 units sc before bedtime, dispense 5 pens (3 refills).  If not covered, can try alternating with one of following: tresiba/basaglar/toujeo/Levemir.  - Humalog Kwikpen: Inject 5 units TID before meals, dispense 5 pens (3 refills). Can try alternating with one of following if not covered: novolog/apidra/admelog/fiasp  - STOP glipizide-metformin and replace with metformin 1000mg BID  - STOP victoza 1.8 mg sq daily for now as we are starting basal bolus and to simplfy regimen, can reassess oupt     To prepare for dc:  -Discussed with patient the importance of Carb consistent diet and exercise as tolerated.    -Recommend nutritional consultation  inpt prior to dc   -Discussed glycemic goal of a1c <7% to prevent microvascular complications of diabetes mellitus and reduce the risk of macrovascular complications.  - Make sure patient knows how to inject insulin and check fingersticks with glucometer (ask bedside RN for teaching)  -Counseled pt on kinetics and action of basal and prandial insulin. Discussed that pt should check FSBG before meals and ALSO take the prandial insulin BEFORE meals   - Discharge on premeal insulin and Lantus. do not dc on correction scale or bedtime scale.  - At home, Patient should check FSBG premeals and bedtime. Pt should call their doctor when FSBG <70 or above >400 and or consistently above 200s as changes in the regimen will have to be made.  -pt should call PMD for DM related questions or concerns until pt is seen by CDE or endocrine   -Recommend annual podiatry and ophthalmology follow up.     -------------------------------------------------------------------------------------------------------------------------------------  DISCHARGE RX:  - Glucometer (ACCU_CHECK tessa Conenct, Ascensia Contour Next EZ or One, Freestyle Freedome LITE or OneTouch Verio IQ)  - Glucometer test strips and lancets  (make sure compatible w glucometer), Dispense #100 (or #200) use as directed  -  BD evangelista 4mm pen needles  Please send Lantus solsotar pen and humalog kwikpen as test script to check insurance coverage.  Ok to send with current doses and update prior to d/c    Lantus Solostar Pen ___ units before bedtime, dispense 15ml  - if not covered- can try alternating with one of following   tresiba/basaglar/toujeo/Levemir    Humalog Flexpen up to ___ dispense 15ml- can try alternating with one of following  if not covered try alternative: novolog/apidra/admelog/fiasp    Patient will also need a glucometer, test strips, lancets, alcohol pads,   if CGM is covered, can send freestyle josé manuel 2-3 for the pt and he can download Freestyle josé manuel tarun on phone to function as reader  Please also prescribe glucose tabs, Baqsimi nasal spray or glucagon emergency kit for hypoglycemia risk     --------------------------------------------------------------------------------------------------------------------------------------  - will need endocrinology follow-up. Please provide clinic info in [place] paperwork: [facility]: [address] [place] [address]. [address]. [place] [address]. Tel: [phone]      #Hypertension  - Goal BP <130/80  - Management as per primary team  - check urine microalbumin level as outpatient    #Hyperlipidemia  - LDL goal <70  - would start moderate dose statin  - check lipid panel as outpatient on a yearly basis    #slightly low calcium  corrected calcium is 8.1 for albumin yesterday  please repeat       Recommendations communicated to primary team    Antonio Zelaya MD  Endocrine Fellow  For nonurgent matters (including consults or followup questions), please email [email or web address] or [email or web address]. For urgent matters, please call answering service at [phone] (weekdays), [phone] (nights/weekends).

## 2024-04-24 NOTE — CONSULT NOTE ADULT - SUBJECTIVE AND OBJECTIVE BOX
HPI: 64-year-old male with past medical history of type 2 diabetes on metformin and 18 Units of insulin QHS, seizure disorder (last seizure 3 months ago, compliant with medications currently on Tegretol) presents to the ER complaining of giddiness, dizziness that began yesterday at 11 AM while he was at work, reported after he started to feel posterior head pain and neck pain. Dizziness worse with standing, but not with moving/ turning head.  Plan is CDU placement for glycemic control, MRI for further characterization of dizz and to R/O CVA or other central etiology, reassessment. Pt has been stable while in CDU, will continue to monitor      DIABETES HX:  - History/diagnosis: T2DM, diagnosed around 15 years ago  - Microvascular complications:   [  ] nephropathy, [  ] retinopathy, [  ] neuropathy  - Macrovascular complications: [  ] CAD,  [  ] CVA  - Follows with: PCP  - A1C with Estimated Average Glucose Result: 14.9 % (04-23-24)  - Home regimen: glipizide-metformin 5mg-500mg 2 tablets BID; victoza 1.8 mg sq daily  - Adherence: patient endorses compliance. Sometimes takes 1.2mg of the victoza if his sugar is low  - Previous meds: none  - Blood sugar: checks FSG daily in the morining  - Hypoglycemia episodes:  - Diet/lifestyle:  - Symptoms:  - Family history:    PAST MEDICAL & SURGICAL HISTORY:  Diabetes      History of seizure      No significant past surgical history          FAMILY HISTORY:  No pertinent family history in first degree relatives        Social History:    Outpatient Medications:     MEDICATIONS  (STANDING):  carBAMazepine ER Tablet 400 milliGRAM(s) Oral daily  dextrose 10% Bolus 125 milliLiter(s) IV Bolus once  dextrose 5%. 1000 milliLiter(s) (100 mL/Hr) IV Continuous <Continuous>  dextrose 5%. 1000 milliLiter(s) (50 mL/Hr) IV Continuous <Continuous>  dextrose 50% Injectable 25 Gram(s) IV Push once  dextrose 50% Injectable 12.5 Gram(s) IV Push once  glucagon  Injectable 1 milliGRAM(s) IntraMuscular once  insulin glargine Injectable (LANTUS) 21 Unit(s) SubCutaneous at bedtime  insulin lispro (ADMELOG) corrective regimen sliding scale   SubCutaneous three times a day before meals  insulin lispro Injectable (ADMELOG) 5 Unit(s) SubCutaneous before breakfast  insulin lispro Injectable (ADMELOG) 5 Unit(s) SubCutaneous before dinner  insulin lispro Injectable (ADMELOG) 5 Unit(s) SubCutaneous before lunch    MEDICATIONS  (PRN):  dextrose Oral Gel 15 Gram(s) Oral once PRN Blood Glucose LESS THAN 70 milliGRAM(s)/deciliter  meclizine 25 milliGRAM(s) Oral every 8 hours PRN Dizziness      Allergies    No Known Allergies    Intolerances      Review of Systems:  Constitutional: No fever  Eyes: No blurry vision  Neuro: No tremors  HEENT: No pain  Cardiovascular: No chest pain, palpitations  Respiratory: No SOB, no cough  GI: No nausea, vomiting, abdominal pain  : No dysuria  Skin: no rash  Psych: no depression  Endocrine: no polyuria, polydipsia  Hem/lymph: no swelling  Osteoporosis: no fractures    ALL OTHER SYSTEMS REVIEWED AND NEGATIVE    PHYSICAL EXAM:  VITALS: T(C): 36.7 (04-24-24 @ 09:56)  T(F): 98 (04-24-24 @ 09:56), Max: 98.7 (04-24-24 @ 01:46)  HR: 86 (04-24-24 @ 09:56) (72 - 86)  BP: 158/89 (04-24-24 @ 09:56) (125/79 - 158/91)  RR:  (15 - 17)  SpO2:  (97% - 100%)  Wt(kg): --  GENERAL: NAD, well-groomed, well-developed  EYES: No proptosis, no lid lag, anicteric  HEENT:  Atraumatic, Normocephalic, moist mucous membranes  RESPIRATORY: Normal respiratory effort; no audible wheezing  SKIN: Dry, intact, No rashes or lesions  MUSCULOSKELETAL: Full range of motion, normal strength  NEURO: sensation intact, extraocular movements intact, no tremor  PSYCH: Alert and oriented x 3, normal affect, normal mood  CUSHING'S SIGNS: no striae                          14.6   5.91  )-----------( 298      ( 23 Apr 2024 11:50 )             44.1       04-23    134<L>  |  100  |  15  ----------------------------<  264<H>  4.6   |  23  |  0.68    eGFR: 104    Ca    8.3<L>      04-23    TPro  7.1  /  Alb  4.2  /  TBili  0.3  /  DBili  x   /  AST  19  /  ALT  34  /  AlkPhos  126<H>  04-23      A1C with Estimated Average Glucose Result: 14.9 % (04-23-24 @ 20:30)  A1C with Estimated Average Glucose Result: 14.1 % (04-23-24 @ 20:30)      CAPILLARY BLOOD GLUCOSE      POCT Blood Glucose.: 173 mg/dL (24 Apr 2024 11:28)  POCT Blood Glucose.: 203 mg/dL (24 Apr 2024 07:34)  POCT Blood Glucose.: 324 mg/dL (23 Apr 2024 21:39)      Thyroid Function Tests:  carBAMazepine ER Tablet 400 milliGRAM(s) Oral daily  dextrose 10% Bolus 125 milliLiter(s) IV Bolus once  dextrose 5%. 1000 milliLiter(s) IV Continuous <Continuous>  dextrose 5%. 1000 milliLiter(s) IV Continuous <Continuous>  dextrose 50% Injectable 25 Gram(s) IV Push once  dextrose 50% Injectable 12.5 Gram(s) IV Push once  dextrose Oral Gel 15 Gram(s) Oral once PRN  glucagon  Injectable 1 milliGRAM(s) IntraMuscular once  insulin glargine Injectable (LANTUS) 21 Unit(s) SubCutaneous at bedtime  insulin lispro (ADMELOG) corrective regimen sliding scale   SubCutaneous three times a day before meals  insulin lispro Injectable (ADMELOG) 5 Unit(s) SubCutaneous before lunch  insulin lispro Injectable (ADMELOG) 5 Unit(s) SubCutaneous before dinner  insulin lispro Injectable (ADMELOG) 5 Unit(s) SubCutaneous before breakfast  meclizine 25 milliGRAM(s) Oral every 8 hours PRN          Radiology:          HPI: 64-year-old male with past medical history of type 2 diabetes on metformin and 18 Units of insulin QHS, seizure disorder (last seizure 3 months ago, compliant with medications currently on Tegretol) presents to the ER complaining of giddiness, dizziness that began yesterday at 11 AM while he was at work, reported after he started to feel posterior head pain and neck pain. Dizziness worse with standing, but not with moving/ turning head.  Plan is CDU placement for glycemic control, MRI for further characterization of dizz and to R/O CVA or other central etiology, reassessment. Pt has been stable while in CDU, will continue to monitor      DIABETES HX:  - History/diagnosis: T2DM, diagnosed around 15 years ago  - Microvascular complications:   [  ] nephropathy, [  ] retinopathy, [  ] neuropathy  - Macrovascular complications: [  ] CAD,  [  ] CVA  - Follows with: PCP  - A1C with Estimated Average Glucose Result: 14.9 % (04-23-24)  - Home regimen: glipizide-metformin 5mg-500mg 2 tablets BID; victoza 1.8 mg sq daily. Not taking insulin as reported in other documentation.  - Adherence: patient endorses compliance. Sometimes takes 1.2mg of the victoza if his sugar is low  - Previous meds: none  - Blood sugar: checks FSG daily in the morning, usually 100-200s but can go to 300-400s  - Hypoglycemia episodes: FSG never <70      PAST MEDICAL & SURGICAL HISTORY:  Diabetes      History of seizure      No significant past surgical history          FAMILY HISTORY:  No pertinent family history in first degree relatives        Social History: No tobacco or alcohol use.   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    Outpatient Medications: glipizide-metformin 5mg-500mg 2 tablets BID; victoza 1.8 mg sq daily    MEDICATIONS  (STANDING):  carBAMazepine ER Tablet 400 milliGRAM(s) Oral daily  dextrose 10% Bolus 125 milliLiter(s) IV Bolus once  dextrose 5%. 1000 milliLiter(s) (100 mL/Hr) IV Continuous <Continuous>  dextrose 5%. 1000 milliLiter(s) (50 mL/Hr) IV Continuous <Continuous>  dextrose 50% Injectable 25 Gram(s) IV Push once  dextrose 50% Injectable 12.5 Gram(s) IV Push once  glucagon  Injectable 1 milliGRAM(s) IntraMuscular once  insulin glargine Injectable (LANTUS) 21 Unit(s) SubCutaneous at bedtime  insulin lispro (ADMELOG) corrective regimen sliding scale   SubCutaneous three times a day before meals  insulin lispro Injectable (ADMELOG) 5 Unit(s) SubCutaneous before breakfast  insulin lispro Injectable (ADMELOG) 5 Unit(s) SubCutaneous before dinner  insulin lispro Injectable (ADMELOG) 5 Unit(s) SubCutaneous before lunch    MEDICATIONS  (PRN):  dextrose Oral Gel 15 Gram(s) Oral once PRN Blood Glucose LESS THAN 70 milliGRAM(s)/deciliter  meclizine 25 milliGRAM(s) Oral every 8 hours PRN Dizziness      Allergies    No Known Allergies    Intolerances      Review of Systems:  Constitutional: No fever  Eyes: No blurry vision  Neuro: No tremors  HEENT: No pain  Cardiovascular: No chest pain, palpitations  Respiratory: No SOB, no cough  GI: No nausea, vomiting, abdominal pain  : No dysuria  Skin: no rash  Psych: no depression  Endocrine: no polyuria, polydipsia  Hem/lymph: no swelling  Osteoporosis: no fractures    ALL OTHER SYSTEMS REVIEWED AND NEGATIVE    PHYSICAL EXAM:  VITALS: T(C): 36.7 (04-24-24 @ 09:56)  T(F): 98 (04-24-24 @ 09:56), Max: 98.7 (04-24-24 @ 01:46)  HR: 86 (04-24-24 @ 09:56) (72 - 86)  BP: 158/89 (04-24-24 @ 09:56) (125/79 - 158/91)  RR:  (15 - 17)  SpO2:  (97% - 100%)  Wt(kg): --  GENERAL: NAD, well-groomed, well-developed  EYES: No proptosis, no lid lag, anicteric  HEENT:  Atraumatic, Normocephalic, moist mucous membranes  RESPIRATORY: Normal respiratory effort; no audible wheezing  SKIN: Dry, intact, No rashes or lesions  MUSCULOSKELETAL: Full range of motion, normal strength  NEURO: sensation intact, extraocular movements intact, no tremor  PSYCH: Alert and oriented x 3, normal affect, normal mood  CUSHING'S SIGNS: no striae                          14.6   5.91  )-----------( 298      ( 23 Apr 2024 11:50 )             44.1       04-23    134<L>  |  100  |  15  ----------------------------<  264<H>  4.6   |  23  |  0.68    eGFR: 104    Ca    8.3<L>      04-23    TPro  7.1  /  Alb  4.2  /  TBili  0.3  /  DBili  x   /  AST  19  /  ALT  34  /  AlkPhos  126<H>  04-23      A1C with Estimated Average Glucose Result: 14.9 % (04-23-24 @ 20:30)  A1C with Estimated Average Glucose Result: 14.1 % (04-23-24 @ 20:30)      CAPILLARY BLOOD GLUCOSE      POCT Blood Glucose.: 173 mg/dL (24 Apr 2024 11:28)  POCT Blood Glucose.: 203 mg/dL (24 Apr 2024 07:34)  POCT Blood Glucose.: 324 mg/dL (23 Apr 2024 21:39)      Thyroid Function Tests:  carBAMazepine ER Tablet 400 milliGRAM(s) Oral daily  dextrose 10% Bolus 125 milliLiter(s) IV Bolus once  dextrose 5%. 1000 milliLiter(s) IV Continuous <Continuous>  dextrose 5%. 1000 milliLiter(s) IV Continuous <Continuous>  dextrose 50% Injectable 25 Gram(s) IV Push once  dextrose 50% Injectable 12.5 Gram(s) IV Push once  dextrose Oral Gel 15 Gram(s) Oral once PRN  glucagon  Injectable 1 milliGRAM(s) IntraMuscular once  insulin glargine Injectable (LANTUS) 21 Unit(s) SubCutaneous at bedtime  insulin lispro (ADMELOG) corrective regimen sliding scale   SubCutaneous three times a day before meals  insulin lispro Injectable (ADMELOG) 5 Unit(s) SubCutaneous before lunch  insulin lispro Injectable (ADMELOG) 5 Unit(s) SubCutaneous before dinner  insulin lispro Injectable (ADMELOG) 5 Unit(s) SubCutaneous before breakfast  meclizine 25 milliGRAM(s) Oral every 8 hours PRN          Radiology:            CC: DM      HPI: 64-year-old male with past medical history of type 2 diabetes on metformin and 18 Units of insulin QHS, seizure disorder (last seizure 3 months ago, compliant with medications currently on Tegretol) presents to the ER complaining of giddiness, dizziness that began yesterday at 11 AM while he was at work, reported after he started to feel posterior head pain and neck pain. Dizziness worse with standing, but not with moving/ turning head.  Plan is CDU placement for glycemic control, MRI for further characterization of dizz and to R/O CVA or other central etiology, reassessment. Pt has been stable while in CDU, will continue to monitor      DIABETES HX:  - History/diagnosis: T2DM, diagnosed around 15 years ago  - Microvascular complications:   [  ] nephropathy, [  ] retinopathy, [  ] neuropathy  - Macrovascular complications: [  ] CAD,  [  ] CVA  - Follows with: PCP  - A1C with Estimated Average Glucose Result: 14.9 % (04-23-24)  - Home regimen: glipizide-metformin 5mg-500mg 2 tablets BID; victoza 1.8 mg sq daily. Not taking insulin as reported in other documentation.  - Adherence: patient endorses compliance. Sometimes takes 1.2mg of the victoza if his sugar is low  - Previous meds: none  - Blood sugar: checks FSG daily in the morning, usually 100-200s but can go to 300-400s  - Hypoglycemia episodes: FSG never <70      PAST MEDICAL & SURGICAL HISTORY:  Diabetes      History of seizure      No significant past surgical history          FAMILY HISTORY:  does not report DM in the family         Social History: No tobacco or alcohol use.   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    Outpatient Medications: glipizide-metformin 5mg-500mg 2 tablets BID; victoza 1.8 mg sq daily    MEDICATIONS  (STANDING):  carBAMazepine ER Tablet 400 milliGRAM(s) Oral daily  dextrose 10% Bolus 125 milliLiter(s) IV Bolus once  dextrose 5%. 1000 milliLiter(s) (100 mL/Hr) IV Continuous <Continuous>  dextrose 5%. 1000 milliLiter(s) (50 mL/Hr) IV Continuous <Continuous>  dextrose 50% Injectable 25 Gram(s) IV Push once  dextrose 50% Injectable 12.5 Gram(s) IV Push once  glucagon  Injectable 1 milliGRAM(s) IntraMuscular once  insulin glargine Injectable (LANTUS) 21 Unit(s) SubCutaneous at bedtime  insulin lispro (ADMELOG) corrective regimen sliding scale   SubCutaneous three times a day before meals  insulin lispro Injectable (ADMELOG) 5 Unit(s) SubCutaneous before breakfast  insulin lispro Injectable (ADMELOG) 5 Unit(s) SubCutaneous before dinner  insulin lispro Injectable (ADMELOG) 5 Unit(s) SubCutaneous before lunch    MEDICATIONS  (PRN):  dextrose Oral Gel 15 Gram(s) Oral once PRN Blood Glucose LESS THAN 70 milliGRAM(s)/deciliter  meclizine 25 milliGRAM(s) Oral every 8 hours PRN Dizziness      Allergies    No Known Allergies    Intolerances      Review of Systems:  Constitutional: No fever  Eyes: No blurry vision  Neuro: No tremors  HEENT: No pain  Cardiovascular: No chest pain, palpitations  Respiratory: No SOB, no cough  GI: No nausea, vomiting, abdominal pain  : No dysuria  Skin: no rash  Psych: no depression  Endocrine: no polyuria, polydipsia  Hem/lymph: no swelling  Osteoporosis: no fractures    ALL OTHER SYSTEMS REVIEWED AND NEGATIVE    PHYSICAL EXAM:  VITALS: T(C): 36.7 (04-24-24 @ 09:56)  T(F): 98 (04-24-24 @ 09:56), Max: 98.7 (04-24-24 @ 01:46)  HR: 86 (04-24-24 @ 09:56) (72 - 86)  BP: 158/89 (04-24-24 @ 09:56) (125/79 - 158/91)  RR:  (15 - 17)  SpO2:  (97% - 100%)  Wt(kg): --  GENERAL: NAD, well-groomed, well-developed  EYES: No proptosis, no lid lag, anicteric  HEENT:  Atraumatic, Normocephalic, moist mucous membranes  RESPIRATORY: Normal respiratory effort; no audible wheezing  SKIN: Dry, intact, No rashes or lesions  MUSCULOSKELETAL: Full range of motion, normal strength  NEURO: sensation intact, extraocular movements intact, no tremor  PSYCH: Alert and oriented x 3, normal affect, normal mood  CUSHING'S SIGNS: no striae                          14.6   5.91  )-----------( 298      ( 23 Apr 2024 11:50 )             44.1       04-23    134<L>  |  100  |  15  ----------------------------<  264<H>  4.6   |  23  |  0.68    eGFR: 104    Ca    8.3<L>      04-23    TPro  7.1  /  Alb  4.2  /  TBili  0.3  /  DBili  x   /  AST  19  /  ALT  34  /  AlkPhos  126<H>  04-23      A1C with Estimated Average Glucose Result: 14.9 % (04-23-24 @ 20:30)  A1C with Estimated Average Glucose Result: 14.1 % (04-23-24 @ 20:30)      CAPILLARY BLOOD GLUCOSE      POCT Blood Glucose.: 173 mg/dL (24 Apr 2024 11:28)  POCT Blood Glucose.: 203 mg/dL (24 Apr 2024 07:34)  POCT Blood Glucose.: 324 mg/dL (23 Apr 2024 21:39)      Thyroid Function Tests:  carBAMazepine ER Tablet 400 milliGRAM(s) Oral daily  dextrose 10% Bolus 125 milliLiter(s) IV Bolus once  dextrose 5%. 1000 milliLiter(s) IV Continuous <Continuous>  dextrose 5%. 1000 milliLiter(s) IV Continuous <Continuous>  dextrose 50% Injectable 25 Gram(s) IV Push once  dextrose 50% Injectable 12.5 Gram(s) IV Push once  dextrose Oral Gel 15 Gram(s) Oral once PRN  glucagon  Injectable 1 milliGRAM(s) IntraMuscular once  insulin glargine Injectable (LANTUS) 21 Unit(s) SubCutaneous at bedtime  insulin lispro (ADMELOG) corrective regimen sliding scale   SubCutaneous three times a day before meals  insulin lispro Injectable (ADMELOG) 5 Unit(s) SubCutaneous before lunch  insulin lispro Injectable (ADMELOG) 5 Unit(s) SubCutaneous before dinner  insulin lispro Injectable (ADMELOG) 5 Unit(s) SubCutaneous before breakfast  meclizine 25 milliGRAM(s) Oral every 8 hours PRN          Radiology:

## 2024-04-24 NOTE — ED CDU PROVIDER DISPOSITION NOTE - PATIENT PORTAL LINK FT
You can access the FollowMyHealth Patient Portal offered by [facility] by registering at the following website: [email or web address]. By joining [facility]’s FollowMyHealth portal, you will also be able to view your health information using other applications (apps) compatible with our system.

## 2024-04-24 NOTE — ED CDU PROVIDER DISPOSITION NOTE - ATTENDING CONTRIBUTION TO CARE
I performed a face-to-face evaluation of the patient and performed a history and physical examination. I agree with the history and physical examination. If this was a PA visit, I personally saw the patient with the PA and performed a substantive portion of the visit including all aspects of the medical decision making. I performed a face-to-face evaluation of the patient and performed a history and physical examination. I agree with the history and physical examination. If this was a PA visit, I personally saw the patient with the PA and performed a substantive portion of the visit including all aspects of the medical decision making..

## 2024-04-24 NOTE — CONSULT NOTE ADULT - ATTENDING COMMENTS
Uncontrolled DM2 with severe hyperglycemia in the 400s at admission   Agree with basal/bolus plan as outlined, adjust as plan above   d/w pt in detail regarding lifestyle changes with carb controlled diet, monitoring FSBG, exercise  would dc on basal bolus as outlined above  long d/w pt for need for DM control and to modify diet   pt states he has been "careless" regarding DM, I d/w pt will need to modify his regimen and start insulin and he will need close fu with PCP and also recommend outpt endocrine fu as well       Endocrine team consulted for uncontrolled diabetes. Patient is high risk with high level decision making due to uncontrolled diabetes which places patient at high risk for cardiovascular and cerebrovascular events. Patient with lability of glucose requiring close monitoring and insulin adjustments. Uncontrolled DM2 with severe hyperglycemia in the 400s at admission   Agree with basal/bolus plan as outlined, adjust as plan above   d/w pt in detail regarding lifestyle changes with carb controlled diet, monitoring FSBG, exercise  would dc on basal bolus as outlined above  long d/w pt for need for DM control and to modify diet   pt states he has been "careless" regarding DM, I d/w pt will need to modify his regimen and start insulin and he will need close fu with PCP and also recommend outpt endocrine fu as well   repeat calcium- calcium noted to be on lower end   check albumin and correct calcium for albumin    Endocrine team consulted for uncontrolled diabetes. Patient is high risk with high level decision making due to uncontrolled diabetes which places patient at high risk for cardiovascular and cerebrovascular events. Patient with lability of glucose requiring close monitoring and insulin adjustments.

## 2024-04-24 NOTE — ED CDU PROVIDER DISPOSITION NOTE - CLINICAL COURSE
64-year-old male with past medical history of type 2 diabetes on metformin and 18 Units of insulin QHS, seizure disorder (last seizure 3 months ago, compliant with medications currently on Tegretol) presents to the ER complaining of giddiness, dizziness that began yesterday at 11 AM while he was at work, reported after he started to feel posterior head pain and neck pain. Dizziness worse with standing, but not with moving/ turning head. 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  Denies head strike or trauma.  Denies fevers,  chills, visual changes, hearing changes, nausea, vomiting, chest pain, shortness of breath, abdominal pain, urinary symptoms, weakness, numbness, tingling. Pt denies any other sx or acute complaints. Pt was seen by Neurology in the ED, recommends MRI for further eval which was ordered. Endocrinology consulted due to uncontrolled DM. MRI shows no acute findings, pt was seen by Neuro and cleared for discharge home. Endo consult also appreciated. Stable for dc home. PMD follow up. Endo follow up. Strict return precautions.

## 2024-04-25 LAB — VIT D25+D1,25 OH+D1,25 PNL SERPL-MCNC: 55.8 PG/ML — SIGNIFICANT CHANGE UP (ref 19.9–79.3)

## 2024-04-26 LAB
-  AMPICILLIN: SIGNIFICANT CHANGE UP
-  CIPROFLOXACIN: SIGNIFICANT CHANGE UP
-  LEVOFLOXACIN: SIGNIFICANT CHANGE UP
-  NITROFURANTOIN: SIGNIFICANT CHANGE UP
-  TETRACYCLINE: SIGNIFICANT CHANGE UP
-  VANCOMYCIN: SIGNIFICANT CHANGE UP
CULTURE RESULTS: ABNORMAL
METHOD TYPE: SIGNIFICANT CHANGE UP
ORGANISM # SPEC MICROSCOPIC CNT: ABNORMAL
ORGANISM # SPEC MICROSCOPIC CNT: ABNORMAL
SPECIMEN SOURCE: SIGNIFICANT CHANGE UP

## 2024-05-23 ENCOUNTER — EMERGENCY (EMERGENCY)
Facility: HOSPITAL | Age: 64
LOS: 1 days | Discharge: HOME | End: 2024-05-23
Attending: EMERGENCY MEDICINE | Admitting: EMERGENCY MEDICINE
Payer: COMMERCIAL

## 2024-05-23 VITALS
HEART RATE: 97 BPM | DIASTOLIC BLOOD PRESSURE: 96 MMHG | RESPIRATION RATE: 20 BRPM | OXYGEN SATURATION: 98 % | SYSTOLIC BLOOD PRESSURE: 196 MMHG | TEMPERATURE: 99 F

## 2024-05-23 PROCEDURE — 99285 EMERGENCY DEPT VISIT HI MDM: CPT

## 2024-05-23 PROCEDURE — 93010 ELECTROCARDIOGRAM REPORT: CPT

## 2024-05-23 NOTE — ED ADULT TRIAGE NOTE - CHIEF COMPLAINT QUOTE
Pt c/o chest pain starting yesterday. Pain is R sided, constant, non radiating. Denies SOB, nausea/vomiting. PMHx DM, Seizures

## 2024-05-24 ENCOUNTER — RESULT REVIEW (OUTPATIENT)
Age: 64
End: 2024-05-24

## 2024-05-24 VITALS
RESPIRATION RATE: 17 BRPM | TEMPERATURE: 98 F | DIASTOLIC BLOOD PRESSURE: 76 MMHG | OXYGEN SATURATION: 98 % | SYSTOLIC BLOOD PRESSURE: 127 MMHG | HEART RATE: 88 BPM

## 2024-05-24 PROBLEM — Z87.898 PERSONAL HISTORY OF OTHER SPECIFIED CONDITIONS: Chronic | Status: ACTIVE | Noted: 2024-04-23

## 2024-05-24 PROBLEM — E11.9 TYPE 2 DIABETES MELLITUS WITHOUT COMPLICATIONS: Chronic | Status: ACTIVE | Noted: 2024-04-23

## 2024-05-24 LAB
ADD ON TEST-SPECIMEN IN LAB: SIGNIFICANT CHANGE UP
ALBUMIN SERPL ELPH-MCNC: 3.9 G/DL — SIGNIFICANT CHANGE UP (ref 3.3–5)
ALP SERPL-CCNC: 116 U/L — SIGNIFICANT CHANGE UP (ref 40–120)
ALT FLD-CCNC: 23 U/L — SIGNIFICANT CHANGE UP (ref 4–41)
ANION GAP SERPL CALC-SCNC: 13 MMOL/L — SIGNIFICANT CHANGE UP (ref 7–14)
APTT BLD: 28.6 SEC — SIGNIFICANT CHANGE UP (ref 24.5–35.6)
AST SERPL-CCNC: 21 U/L — SIGNIFICANT CHANGE UP (ref 4–40)
BASOPHILS # BLD AUTO: 0.03 K/UL — SIGNIFICANT CHANGE UP (ref 0–0.2)
BASOPHILS NFR BLD AUTO: 0.4 % — SIGNIFICANT CHANGE UP (ref 0–2)
BILIRUB SERPL-MCNC: 0.4 MG/DL — SIGNIFICANT CHANGE UP (ref 0.2–1.2)
BUN SERPL-MCNC: 18 MG/DL — SIGNIFICANT CHANGE UP (ref 7–23)
CALCIUM SERPL-MCNC: 9.6 MG/DL — SIGNIFICANT CHANGE UP (ref 8.4–10.5)
CHLORIDE SERPL-SCNC: 99 MMOL/L — SIGNIFICANT CHANGE UP (ref 98–107)
CO2 SERPL-SCNC: 22 MMOL/L — SIGNIFICANT CHANGE UP (ref 22–31)
CREAT SERPL-MCNC: 0.73 MG/DL — SIGNIFICANT CHANGE UP (ref 0.5–1.3)
D DIMER BLD IA.RAPID-MCNC: <150 NG/ML DDU — SIGNIFICANT CHANGE UP
EGFR: 102 ML/MIN/1.73M2 — SIGNIFICANT CHANGE UP
EOSINOPHIL # BLD AUTO: 0.19 K/UL — SIGNIFICANT CHANGE UP (ref 0–0.5)
EOSINOPHIL NFR BLD AUTO: 2.4 % — SIGNIFICANT CHANGE UP (ref 0–6)
GLUCOSE SERPL-MCNC: 301 MG/DL — HIGH (ref 70–99)
HCT VFR BLD CALC: 41.4 % — SIGNIFICANT CHANGE UP (ref 39–50)
HGB BLD-MCNC: 13.4 G/DL — SIGNIFICANT CHANGE UP (ref 13–17)
IANC: 4.18 K/UL — SIGNIFICANT CHANGE UP (ref 1.8–7.4)
IMM GRANULOCYTES NFR BLD AUTO: 0.3 % — SIGNIFICANT CHANGE UP (ref 0–0.9)
INR BLD: 0.9 RATIO — SIGNIFICANT CHANGE UP (ref 0.85–1.18)
LYMPHOCYTES # BLD AUTO: 2.58 K/UL — SIGNIFICANT CHANGE UP (ref 1–3.3)
LYMPHOCYTES # BLD AUTO: 33.2 % — SIGNIFICANT CHANGE UP (ref 13–44)
MCHC RBC-ENTMCNC: 28.2 PG — SIGNIFICANT CHANGE UP (ref 27–34)
MCHC RBC-ENTMCNC: 32.4 GM/DL — SIGNIFICANT CHANGE UP (ref 32–36)
MCV RBC AUTO: 87.2 FL — SIGNIFICANT CHANGE UP (ref 80–100)
MONOCYTES # BLD AUTO: 0.77 K/UL — SIGNIFICANT CHANGE UP (ref 0–0.9)
MONOCYTES NFR BLD AUTO: 9.9 % — SIGNIFICANT CHANGE UP (ref 2–14)
NEUTROPHILS # BLD AUTO: 4.18 K/UL — SIGNIFICANT CHANGE UP (ref 1.8–7.4)
NEUTROPHILS NFR BLD AUTO: 53.8 % — SIGNIFICANT CHANGE UP (ref 43–77)
NRBC # BLD: 0 /100 WBCS — SIGNIFICANT CHANGE UP (ref 0–0)
NRBC # FLD: 0 K/UL — SIGNIFICANT CHANGE UP (ref 0–0)
NT-PROBNP SERPL-SCNC: 43 PG/ML — SIGNIFICANT CHANGE UP
PLATELET # BLD AUTO: 253 K/UL — SIGNIFICANT CHANGE UP (ref 150–400)
POTASSIUM SERPL-MCNC: 4.6 MMOL/L — SIGNIFICANT CHANGE UP (ref 3.5–5.3)
POTASSIUM SERPL-SCNC: 4.6 MMOL/L — SIGNIFICANT CHANGE UP (ref 3.5–5.3)
PROT SERPL-MCNC: 7.3 G/DL — SIGNIFICANT CHANGE UP (ref 6–8.3)
PROTHROM AB SERPL-ACNC: 10.2 SEC — SIGNIFICANT CHANGE UP (ref 9.5–13)
RBC # BLD: 4.75 M/UL — SIGNIFICANT CHANGE UP (ref 4.2–5.8)
RBC # FLD: 11.7 % — SIGNIFICANT CHANGE UP (ref 10.3–14.5)
SODIUM SERPL-SCNC: 134 MMOL/L — LOW (ref 135–145)
TROPONIN T, HIGH SENSITIVITY RESULT: 15 NG/L — SIGNIFICANT CHANGE UP
TROPONIN T, HIGH SENSITIVITY RESULT: 15 NG/L — SIGNIFICANT CHANGE UP
WBC # BLD: 7.77 K/UL — SIGNIFICANT CHANGE UP (ref 3.8–10.5)
WBC # FLD AUTO: 7.77 K/UL — SIGNIFICANT CHANGE UP (ref 3.8–10.5)

## 2024-05-24 PROCEDURE — 93016 CV STRESS TEST SUPVJ ONLY: CPT | Mod: GC,MC

## 2024-05-24 PROCEDURE — 78451 HT MUSCLE IMAGE SPECT SING: CPT | Mod: 26,MC

## 2024-05-24 PROCEDURE — 93018 CV STRESS TEST I&R ONLY: CPT | Mod: GC,MC

## 2024-05-24 PROCEDURE — 71046 X-RAY EXAM CHEST 2 VIEWS: CPT | Mod: 26

## 2024-05-24 PROCEDURE — 99236 HOSP IP/OBS SAME DATE HI 85: CPT

## 2024-05-24 RX ORDER — ASPIRIN/CALCIUM CARB/MAGNESIUM 324 MG
162 TABLET ORAL ONCE
Refills: 0 | Status: COMPLETED | OUTPATIENT
Start: 2024-05-24 | End: 2024-05-24

## 2024-05-24 RX ORDER — GLUCAGON INJECTION, SOLUTION 0.5 MG/.1ML
1 INJECTION, SOLUTION SUBCUTANEOUS ONCE
Refills: 0 | Status: DISCONTINUED | OUTPATIENT
Start: 2024-05-24 | End: 2024-05-27

## 2024-05-24 RX ORDER — DEXTROSE 50 % IN WATER 50 %
15 SYRINGE (ML) INTRAVENOUS ONCE
Refills: 0 | Status: DISCONTINUED | OUTPATIENT
Start: 2024-05-24 | End: 2024-05-27

## 2024-05-24 RX ORDER — DEXTROSE 50 % IN WATER 50 %
25 SYRINGE (ML) INTRAVENOUS ONCE
Refills: 0 | Status: DISCONTINUED | OUTPATIENT
Start: 2024-05-24 | End: 2024-05-27

## 2024-05-24 RX ORDER — SODIUM CHLORIDE 9 MG/ML
1000 INJECTION, SOLUTION INTRAVENOUS
Refills: 0 | Status: DISCONTINUED | OUTPATIENT
Start: 2024-05-24 | End: 2024-05-27

## 2024-05-24 RX ORDER — INSULIN LISPRO 100/ML
5 VIAL (ML) SUBCUTANEOUS
Refills: 0 | Status: DISCONTINUED | OUTPATIENT
Start: 2024-05-24 | End: 2024-05-27

## 2024-05-24 RX ORDER — INSULIN LISPRO 100/ML
VIAL (ML) SUBCUTANEOUS
Refills: 0 | Status: DISCONTINUED | OUTPATIENT
Start: 2024-05-24 | End: 2024-05-27

## 2024-05-24 RX ORDER — INSULIN LISPRO 100/ML
7 VIAL (ML) SUBCUTANEOUS ONCE
Refills: 0 | Status: COMPLETED | OUTPATIENT
Start: 2024-05-24 | End: 2024-05-24

## 2024-05-24 RX ORDER — ACETAMINOPHEN 500 MG
650 TABLET ORAL ONCE
Refills: 0 | Status: COMPLETED | OUTPATIENT
Start: 2024-05-24 | End: 2024-05-24

## 2024-05-24 RX ORDER — CARBAMAZEPINE 200 MG
400 TABLET ORAL
Refills: 0 | Status: DISCONTINUED | OUTPATIENT
Start: 2024-05-24 | End: 2024-05-24

## 2024-05-24 RX ORDER — CARBAMAZEPINE 200 MG
400 TABLET ORAL DAILY
Refills: 0 | Status: DISCONTINUED | OUTPATIENT
Start: 2024-05-24 | End: 2024-05-27

## 2024-05-24 RX ORDER — ASPIRIN/CALCIUM CARB/MAGNESIUM 324 MG
81 TABLET ORAL DAILY
Refills: 0 | Status: DISCONTINUED | OUTPATIENT
Start: 2024-05-25 | End: 2024-05-27

## 2024-05-24 RX ORDER — DEXTROSE 50 % IN WATER 50 %
12.5 SYRINGE (ML) INTRAVENOUS ONCE
Refills: 0 | Status: DISCONTINUED | OUTPATIENT
Start: 2024-05-24 | End: 2024-05-27

## 2024-05-24 RX ORDER — DEXTROSE 10 % IN WATER 10 %
125 INTRAVENOUS SOLUTION INTRAVENOUS ONCE
Refills: 0 | Status: DISCONTINUED | OUTPATIENT
Start: 2024-05-24 | End: 2024-05-27

## 2024-05-24 RX ORDER — INSULIN GLARGINE 100 [IU]/ML
15 INJECTION, SOLUTION SUBCUTANEOUS AT BEDTIME
Refills: 0 | Status: DISCONTINUED | OUTPATIENT
Start: 2024-05-24 | End: 2024-05-27

## 2024-05-24 RX ADMIN — Medication 7 UNIT(S): at 03:46

## 2024-05-24 RX ADMIN — Medication 650 MILLIGRAM(S): at 11:45

## 2024-05-24 RX ADMIN — Medication 650 MILLIGRAM(S): at 02:46

## 2024-05-24 RX ADMIN — Medication 400 MILLIGRAM(S): at 12:27

## 2024-05-24 RX ADMIN — Medication 162 MILLIGRAM(S): at 01:27

## 2024-05-24 RX ADMIN — Medication 5 UNIT(S): at 11:46

## 2024-05-24 RX ADMIN — Medication 5 UNIT(S): at 09:10

## 2024-05-24 RX ADMIN — Medication 3: at 11:46

## 2024-05-24 NOTE — ED ADULT NURSE NOTE - OBJECTIVE STATEMENT
Pt AxOx4. Pt c/o of non radiating R sided chest pain that started yesterday . Denies N/V/ Dizziness. Pt breathing even and unlabored. Labs Drawn . Care ongoing

## 2024-05-24 NOTE — ED CDU PROVIDER INITIAL DAY NOTE - ATTENDING APP SHARED VISIT CONTRIBUTION OF CARE
65 y/o M with h/o seizures, DM on insulin here with 1 day of chest pain.  Pt endorses R sided nonradiating chest pain, worse with deep inspiration and lying back.  Denies associated fever, chills, sob, palpitations, cough, back pain, n/v, leg pain or swelling.  Pt denies any known cardiac hx, but never has had a cardiac work-up.    Well appearing, lying in stretcher, awake and alert, nontoxic.  AF/VSS.  Lungs cta bl.  Cards nl S1/S2, RRR, no MRG.  Abd soft ntnd.  No pedal edema or calf tenderness.    Concern for acs given age and dm hx, no risk factors or resp sxs to suggest PE d-dimer neg, do not suspect aortic catastrophe, booerhaaves, ptx, pna.  plan for ekg, labs incl trop, cxr, asa, cdu vs admit for acs work up.

## 2024-05-24 NOTE — ED CDU PROVIDER DISPOSITION NOTE - PATIENT PORTAL LINK FT
You can access the FollowMyHealth Patient Portal offered by [facility] by registering at the following website: [email or web address]. By joining [email or web address] [email or web address] portal, you will also be able to view your health information using other applications (apps) compatible with our system.

## 2024-05-24 NOTE — ED CDU PROVIDER INITIAL DAY NOTE - CLINICAL SUMMARY MEDICAL DECISION MAKING FREE TEXT BOX
64-year-old male with history of seizure disorder and diabetes presents complaining of right-sided chest pain for 1 day.  Patient notes pain is pleuritic and worse with deep inspirations.  Pain does not worsen with exertion.  Patient notes that he has been working out but denies heavy lifting or push-ups but is unsure/denies any inciting injury.  No associated shortness of breath.  EKG normal sinus without ST elevations or depressions.  Labs within normal limits, dimer <150.  Troponin 15 > 15.  A1c noted to be 12.2.  Chest x-ray with clear lungs.  At time of assessment, following medication, pleurisy has since resolved. Patient placed in CDU for telemetry monitoring, stress test, and endocrine eval given A1c. 64-year-old male with history of seizure disorder and diabetes presents complaining of right-sided chest pain for 1 day.  Patient notes pain is pleuritic and worse with deep inspirations.  Pain does not worsen with exertion.  Patient notes that he has been working out but denies heavy lifting or push-ups but is unsure/denies any inciting injury.  No associated shortness of breath.  EKG normal sinus without ST elevations or depressions.  Labs within normal limits, dimer <150.  Troponin 15 > 15. A1c noted to be 12.2 (improved from 14, 1 month ago, no need for endocrine c/s as pt stayed in CDU at that time for DM management).  Chest x-ray with clear lungs.  At time of assessment, following medication, pleurisy has since resolved. Patient placed in CDU for telemetry monitoring, stress test, and tele doc eval.

## 2024-05-24 NOTE — ED PROVIDER NOTE - CLINICAL SUMMARY MEDICAL DECISION MAKING FREE TEXT BOX
64-year-old male past medical history seizure disorder diabetes presents with 1 day of right-sided chest pain.  Chest pain is sharp pleuritic nonexertional.   Pain has been constant since onset.  Nonradiating.  No history of similar symptoms.  No inciting event or trauma to the area.  No shortness of breath.  No history of similar symptoms.  No recent cardiologist.  No lower extremity swelling.  No   Recent travel hospitalizations surgeries history of clots.  No cough sore throat nasal congestion fevers chills.  No nausea vomiting sweating.   On arrival vital signs stable.  Exam with well-appearing male no acute distress.  Regular rate and rhythm.  Lungs clear bilaterally.  Abdomen soft nontender nondistended.  Alert and oriented x 3 moving all extremities spontaneously.  No lower extremity swelling or tenderness.  Presentation concerning for ACS.  Although none low risk will evaluate for pulmonary embolism given pleuritic nature of chest pain.  Concern for MSK.  Plan for labs EKG chest x-ray meds and reassess.

## 2024-05-24 NOTE — ED ADULT NURSE REASSESSMENT NOTE - NS ED NURSE REASSESS COMMENT FT1
Break covering RN: patient received, appears to be resting comfortably in bed, no complaints noted at this time. Breathing even and unlabored, pallor/diaphoresis not noted. will continue to monitor. Break covering RN: patient received, appears to be resting comfortably in bed, no complaints noted at this time. Breathing even and unlabored, pallor/diaphoresis not noted. NSR, RA. will continue to monitor.

## 2024-05-24 NOTE — ED CDU PROVIDER DISPOSITION NOTE - CLINICAL COURSE
64-year-old male with history of seizure disorder and diabetes presents complaining of right-sided chest pain for 1 day.  Patient notes pain is pleuritic and worse with deep inspirations.  Pain does not worsen with exertion.  Patient notes that he has been working out but denies heavy lifting or push-ups but is unsure/denies any inciting injury.  No associated shortness of breath.  EKG normal sinus without ST elevations or depressions.  Labs within normal limits, dimer <150.  Troponin 15 > 15.  A1c noted to be 12.2 (improved from 14, 1 month ago, no need for endocrine c/s as pt staye in CDU at that time for DM management).  Chest x-ray with clear lungs.  At time of assessment, following medication, pleurisy has since resolved. Patient placed in CDU for telemetry monitoring, stress test, and tele doc eval.  Stress wnl - seen by tele doc stable for DC.   A1c 12 trend[facility] down [facility] 14 [facility]

## 2024-05-24 NOTE — ED CDU PROVIDER INITIAL DAY NOTE - PHYSICAL EXAMINATION
CONSTITUTIONAL: Well-appearing; well-nourished; in no apparent distress. Non-toxic appearing.   NEURO: Alert & oriented. Gait steady without assistance. Sensory and motor functions are grossly intact.  PSYCH: Mood appropriate. Thought processes intact.   NECK: Supple  CARD: Regular rate and rhythm, no murmurs, no reproducible chest wall tenderness.   RESP: No accessory muscle use; breath sounds clear and equal bilaterally; no wheezes, rhonchi, or rales     ABD: Soft; non-distended; non-tender. No guarding or rebound.   MUSCULOSKELETAL/EXTREMITIES: FROM in all four extremities; no extremity edema.  SKIN: Warm; dry; no apparent lesions or exudate

## 2024-05-24 NOTE — CONSULT NOTE ADULT - SUBJECTIVE AND OBJECTIVE BOX
Cardiovascular Disease Initial Evaluation  Date of service: 05-24-24 @ 10:49    CHIEF COMPLAINT:  Chest pain     HISTORY OF PRESENT ILLNESS: : 64-year-old male with history of seizure disorder and diabetes presents complaining of right-sided chest pain for 1 day.  Patient notes pain is pleuritic and worse with deep inspirations.  Pain does not worsen with exertion.  Patient notes that he has been working out but denies heavy lifting or push-ups but is unsure/denies any inciting injury.  No associated shortness of breath.  At time of assessment, following medication, pleurisy has since resolved.      Allergies    No Known Allergies    Intolerances    	    MEDICATIONS:  aspirin  chewable 81 milliGRAM(s) Oral daily        carBAMazepine ER Tablet 400 milliGRAM(s) Oral daily      dextrose 50% Injectable 25 Gram(s) IV Push once  dextrose 50% Injectable 12.5 Gram(s) IV Push once  dextrose Oral Gel 15 Gram(s) Oral once PRN  glucagon  Injectable 1 milliGRAM(s) IntraMuscular once  insulin glargine Injectable (LANTUS) 15 Unit(s) SubCutaneous at bedtime  insulin lispro (ADMELOG) corrective regimen sliding scale   SubCutaneous three times a day before meals  insulin lispro Injectable (ADMELOG) 5 Unit(s) SubCutaneous three times a day before meals    dextrose 10% Bolus 125 milliLiter(s) IV Bolus once  dextrose 5%. 1000 milliLiter(s) IV Continuous <Continuous>  dextrose 5%. 1000 milliLiter(s) IV Continuous <Continuous>      PAST MEDICAL & SURGICAL HISTORY:  Diabetes      History of seizure      No significant past surgical history          FAMILY HISTORY:  No pertinent family history in first degree relatives        SOCIAL HISTORY:    The patient is a nonsmoker       REVIEW OF SYSTEMS:  See HPI, otherwise complete 14 point review of systems negative    [x ] All others negative	  [ ] Unable to obtain    PHYSICAL EXAM:  T(C): 36.6 (05-24-24 @ 09:50), Max: 37 (05-23-24 @ 22:47)  HR: 88 (05-24-24 @ 09:50) (70 - 97)  BP: 127/76 (05-24-24 @ 09:50) (127/76 - 196/96)  RR: 17 (05-24-24 @ 09:50) (16 - 20)  SpO2: 98% (05-24-24 @ 09:50) (98% - 100%)  Wt(kg): --  I&O's Summary      Appearance: No Acute Distress; resting comfortably  HEENT:  Normal oral mucosa, PERRL, EOMI	  Cardiovascular: Normal S1 S2, No JVD, No murmurs/rubs/gallops  Respiratory: Normal respiratory effort; Lungs clear to auscultation bilaterally  Gastrointestinal:  Soft, Non-tender, + BS	  Skin: No rashes, No ecchymoses, No cyanosis	  Neurologic: Non-focal; no weakness  Extremities: No clubbing, cyanosis or edema  Vascular: Peripheral pulses palpable 2+ bilaterally  Psychiatry: A & O x 3, Mood & affect appropriate    Laboratory Data:	 	    CBC Full  -  ( 24 May 2024 01:10 )  WBC Count : 7.77 K/uL  Hemoglobin : 13.4 g/dL  Hematocrit : 41.4 %  Platelet Count - Automated : 253 K/uL  Mean Cell Volume : 87.2 fL  Mean Cell Hemoglobin : 28.2 pg  Mean Cell Hemoglobin Concentration : 32.4 gm/dL  Auto Neutrophil # : 4.18 K/uL  Auto Lymphocyte # : 2.58 K/uL  Auto Monocyte # : 0.77 K/uL  Auto Eosinophil # : 0.19 K/uL  Auto Basophil # : 0.03 K/uL  Auto Neutrophil % : 53.8 %  Auto Lymphocyte % : 33.2 %  Auto Monocyte % : 9.9 %  Auto Eosinophil % : 2.4 %  Auto Basophil % : 0.4 %    05-24    134<L>  |  99  |  18  ----------------------------<  301<H>  4.6   |  22  |  0.73    Ca    9.6      24 May 2024 01:10    TPro  7.3  /  Alb  3.9  /  TBili  0.4  /  DBili  x   /  AST  21  /  ALT  23  /  AlkPhos  116  05-24      proBNP:   Lipid Profile:   HgA1c:   TSH:       CARDIAC MARKERS:            Interpretation of Telemetry: Sinus 	    ECG: Sinus rhythm	  RADIOLOGY:  OTHER: 	    PREVIOUS DIAGNOSTIC TESTING:    [ ] Echocardiogram:  [ ] Catheterization:  [ ] Stress Test:  	    Assessment: 64-year-old male with history of seizure disorder and diabetes presents complaining of right-sided chest pain.    Plan of Care:    #Chest pain   - No evidence of ACS  - EKG shows sinus rhythm  - Plan for nuclear stress test  - If normal , patient may be discharged from a cardiac standpoint    #DM  - A1c noted to be 12.2 (improved from 14, 1 month ago  - ISS  - Defer to primary team    #Seizure disorder  - On Tegretol     #ACP (advance care planning)-  Advanced care planning was discussed with the patient.  Risks, benefits and alternatives of medical treatment and procedures were discussed in detail and all questions were answered. 30 additional minutes spent addressing advance care plans.       Complex medical decision making in a patient with multiple co-morbidities.   77 minutes spent on total encounter; more than 50% of the visit was spent counseling and/or coordinating care by the attending physician.   	  Rigo Teran,  [facility]  Cardiovascular Diseases  [phone]

## 2024-05-24 NOTE — ED PROVIDER NOTE - ATTENDING CONTRIBUTION TO CARE
65 y/o M with h/o seizures, DM on insulin here with 1 day of chest pain.  Pt endorses R sided nonradiating chest pain, worse with deep inspiration and lying back.  Denies associated fever, chills, sob, palpitations, cough, back pain, n/v, leg pain or swelling.  Pt denies any known cardiac hx, but never has had a cardiac work-up.    Well appearing, lying in stretcher, awake and alert, nontoxic.  AF/VSS.  Lungs cta bl.  Cards nl S1/S2, RRR, no MRG.  Abd soft ntnd.  No pedal edema or calf tenderness.    Concern for acs given age and dm hx, no risk factors or resp sxs to suggest PE but with pleuritic pain will eval with d-dimer, do not suspect aortic catastrophe, booerhaaves, ptx, pna.  plan for ekg, labs incl trop, cxr, asa, cdu vs admit for acs work up.

## 2024-05-24 NOTE — CONSULT NOTE ADULT - REASON FOR ADMISSION
Chest pain For information on Fall & Injury Prevention, visit [email or web address]

## 2024-05-24 NOTE — ED CDU PROVIDER INITIAL DAY NOTE - OBJECTIVE STATEMENT
64-year-old male with history of seizure disorder and diabetes presents complaining of right-sided chest pain for 1 day.  Patient notes pain is pleuritic and worse with deep inspirations.  Pain does not worsen with exertion.  Patient notes that he has been working out but denies heavy lifting or push-ups but is unsure/denies any inciting injury.  No associated shortness of breath.  EKG normal sinus without ST elevations or depressions.  Labs within normal limits, dimer <150.  Troponin 15 > 15.  A1c noted to be 12.2.  Chest x-ray with clear lungs.  At time of assessment, following medication, pleurisy has since resolved. Patient placed in CDU for telemetry monitoring, stress test, and endocrine eval given A1c. 64-year-old male with history of seizure disorder and diabetes presents complaining of right-sided chest pain for 1 day.  Patient notes pain is pleuritic and worse with deep inspirations.  Pain does not worsen with exertion.  Patient notes that he has been working out but denies heavy lifting or push-ups but is unsure/denies any inciting injury.  No associated shortness of breath.  EKG normal sinus without ST elevations or depressions.  Labs within normal limits, dimer <150.  Troponin 15 > 15.  A1c noted to be 12.2 (improved from 14, 1 month ago, no need for endocrine c/s as pt staye in CDU at that time for DM management).  Chest x-ray with clear lungs.  At time of assessment, following medication, pleurisy has since resolved. Patient placed in CDU for telemetry monitoring, stress test, and tele doc eval.

## 2024-05-24 NOTE — ED CDU PROVIDER DISPOSITION NOTE - NSFOLLOWUPINSTRUCTIONS_ED_ALL_ED_FT
Chest Pain  WHAT YOU NEED TO KNOW:  Chest pain can be caused by a range of conditions, from not serious to life-threatening. Chest pain can be a symptom of a digestive problem, such as acid reflux or a stomach ulcer. An anxiety attack or a strong emotion, such as anger, can also cause chest pain. Infection, inflammation, or a fracture in the bones or cartilage in your chest can cause pain or discomfort. Sometimes chest pain or pressure is caused by poor blood flow to your heart (angina). Chest pain may also be caused by life-threatening conditions such as a heart attack or blood clot in your lungs.   DISCHARGE INSTRUCTIONS:  Call 911 if:   •You have any of the following signs of a heart attack: ?Squeezing, pressure, or pain in your chest  ?You may also have any of the following: ?Discomfort or pain in your back, neck, jaw, stomach, or arm  ?Shortness of breath  ?Nausea or vomiting  ?Lightheadedness or a sudden cold sweat  Seek care immediately if:   •You have chest discomfort that gets worse, even with medicine.  •You cough or vomit blood.   •Your bowel movements are black or bloody.   •You cannot stop vomiting, or it hurts to swallow.   Contact your healthcare provider if:   •You have questions or concerns about your condition or care.  Medicines:   •Medicines may be given to treat the cause of your chest pain. Examples include pain medicine, anxiety medicine, or medicines to increase blood flow to your heart.   •Do not take certain medicines without asking your healthcare provider first. These include NSAIDs, herbal or vitamin supplements, or hormones (estrogen or progestin).   •Take your medicine as directed. Contact your healthcare provider if you think your medicine is not helping or if you have side effects. Tell him or her if you are allergic to any medicine. Keep a list of the medicines, vitamins, and herbs you take. Include the amounts, and when and why you take them. Bring the list or the pill bottles to follow-up visits. Carry your medicine list with you in case of an emergency.  Follow up with your healthcare provider within 72 hours, or as directed: You may need to return for more tests to find the cause of your chest pain. You may be referred to a specialist, such as a cardiologist or gastroenterologist. Write down your questions so you remember to ask them during your visits.   Healthy living tips: The following are general healthy guidelines. If your chest pain is caused by a heart problem, your healthcare provider will give you specific guidelines to follow.  •Do not smoke. Nicotine and other chemicals in cigarettes and cigars can cause lung and heart damage. Ask your healthcare provider for information if you currently smoke and need help to quit. E-cigarettes or smokeless tobacco still contain nicotine. Talk to your healthcare provider before you use these products.   •Eat a variety of healthy, low-fat, low-salt foods. Healthy foods include fruits, vegetables, whole-grain breads, low-fat dairy products, beans, lean meats, and fish. Ask for more information about a heart healthy diet.  •Drink plenty of water every day. Your body is made of mostly water. Water helps your body to control temperature and blood pressure. Ask your healthcare provider how much water you should drink every day.  •Ask about activity. Your healthcare provider will tell you which activities to limit or avoid. Ask when you can drive, return to work, and have sex. Ask about the best exercise plan for you.  •Maintain a healthy weight. Ask your healthcare provider how much you should weigh. Ask him or her to help you create a weight loss plan if you are overweight.   If you have a stent:   •Carry your stent card with you at all times.   •Let all healthcare providers know that you have a stent.

## 2024-07-25 ENCOUNTER — EMERGENCY (EMERGENCY)
Facility: HOSPITAL | Age: 64
LOS: 1 days | Discharge: HOME | End: 2024-07-25
Admitting: STUDENT IN AN ORGANIZED HEALTH CARE EDUCATION/TRAINING PROGRAM
Payer: COMMERCIAL

## 2024-07-25 VITALS
RESPIRATION RATE: 18 BRPM | TEMPERATURE: 98 F | HEART RATE: 70 BPM | DIASTOLIC BLOOD PRESSURE: 91 MMHG | SYSTOLIC BLOOD PRESSURE: 174 MMHG | OXYGEN SATURATION: 99 %

## 2024-07-25 VITALS
OXYGEN SATURATION: 100 % | HEART RATE: 78 BPM | DIASTOLIC BLOOD PRESSURE: 82 MMHG | TEMPERATURE: 99 F | RESPIRATION RATE: 18 BRPM | SYSTOLIC BLOOD PRESSURE: 157 MMHG

## 2024-07-25 DIAGNOSIS — T50.905A ADVERSE EFFECT OF UNSPECIFIED DRUGS, MEDICAMENTS AND BIOLOGICAL SUBSTANCES, INITIAL ENCOUNTER: ICD-10-CM

## 2024-07-25 LAB
ADD ON TEST-SPECIMEN IN LAB: SIGNIFICANT CHANGE UP
ALBUMIN SERPL ELPH-MCNC: 4.4 G/DL — SIGNIFICANT CHANGE UP (ref 3.3–5)
ALP SERPL-CCNC: 155 U/L — HIGH (ref 40–120)
ALT FLD-CCNC: 60 U/L — HIGH (ref 4–41)
AMPHET UR-MCNC: NEGATIVE — SIGNIFICANT CHANGE UP
ANION GAP SERPL CALC-SCNC: 13 MMOL/L — SIGNIFICANT CHANGE UP (ref 7–14)
APAP SERPL-MCNC: <10 UG/ML — LOW (ref 15–25)
APPEARANCE UR: CLEAR — SIGNIFICANT CHANGE UP
AST SERPL-CCNC: 32 U/L — SIGNIFICANT CHANGE UP (ref 4–40)
BARBITURATES UR SCN-MCNC: NEGATIVE — SIGNIFICANT CHANGE UP
BASOPHILS # BLD AUTO: 0.01 K/UL — SIGNIFICANT CHANGE UP (ref 0–0.2)
BASOPHILS NFR BLD AUTO: 0.2 % — SIGNIFICANT CHANGE UP (ref 0–2)
BENZODIAZ UR-MCNC: NEGATIVE — SIGNIFICANT CHANGE UP
BILIRUB SERPL-MCNC: 0.3 MG/DL — SIGNIFICANT CHANGE UP (ref 0.2–1.2)
BILIRUB UR-MCNC: NEGATIVE — SIGNIFICANT CHANGE UP
BUN SERPL-MCNC: 19 MG/DL — SIGNIFICANT CHANGE UP (ref 7–23)
CALCIUM SERPL-MCNC: 9.8 MG/DL — SIGNIFICANT CHANGE UP (ref 8.4–10.5)
CHLORIDE SERPL-SCNC: 100 MMOL/L — SIGNIFICANT CHANGE UP (ref 98–107)
CO2 SERPL-SCNC: 25 MMOL/L — SIGNIFICANT CHANGE UP (ref 22–31)
COCAINE METAB.OTHER UR-MCNC: NEGATIVE — SIGNIFICANT CHANGE UP
COLOR SPEC: YELLOW — SIGNIFICANT CHANGE UP
CREAT SERPL-MCNC: 0.82 MG/DL — SIGNIFICANT CHANGE UP (ref 0.5–1.3)
CREATININE URINE RESULT, DAU: 32 MG/DL — SIGNIFICANT CHANGE UP
DIFF PNL FLD: NEGATIVE — SIGNIFICANT CHANGE UP
EGFR: 99 ML/MIN/1.73M2 — SIGNIFICANT CHANGE UP
EGFR: 99 ML/MIN/1.73M2 — SIGNIFICANT CHANGE UP
EOSINOPHIL # BLD AUTO: 0.05 K/UL — SIGNIFICANT CHANGE UP (ref 0–0.5)
EOSINOPHIL NFR BLD AUTO: 0.8 % — SIGNIFICANT CHANGE UP (ref 0–6)
ETHANOL SERPL-MCNC: <10 MG/DL — SIGNIFICANT CHANGE UP
FENTANYL UR QL SCN: NEGATIVE — SIGNIFICANT CHANGE UP
GLUCOSE SERPL-MCNC: 250 MG/DL — HIGH (ref 70–99)
GLUCOSE UR QL: >=1000 MG/DL
HCT VFR BLD CALC: 44.5 % — SIGNIFICANT CHANGE UP (ref 39–50)
HGB BLD-MCNC: 14.9 G/DL — SIGNIFICANT CHANGE UP (ref 13–17)
IANC: 3.59 K/UL — SIGNIFICANT CHANGE UP (ref 1.8–7.4)
IMM GRANULOCYTES NFR BLD AUTO: 0.2 % — SIGNIFICANT CHANGE UP (ref 0–0.9)
KETONES UR-MCNC: 15 MG/DL
LEUKOCYTE ESTERASE UR-ACNC: NEGATIVE — SIGNIFICANT CHANGE UP
LYMPHOCYTES # BLD AUTO: 2.14 K/UL — SIGNIFICANT CHANGE UP (ref 1–3.3)
LYMPHOCYTES # BLD AUTO: 34.6 % — SIGNIFICANT CHANGE UP (ref 13–44)
MCHC RBC-ENTMCNC: 28 PG — SIGNIFICANT CHANGE UP (ref 27–34)
MCHC RBC-ENTMCNC: 33.5 GM/DL — SIGNIFICANT CHANGE UP (ref 32–36)
MCV RBC AUTO: 83.5 FL — SIGNIFICANT CHANGE UP (ref 80–100)
METHADONE UR-MCNC: NEGATIVE — SIGNIFICANT CHANGE UP
MONOCYTES # BLD AUTO: 0.39 K/UL — SIGNIFICANT CHANGE UP (ref 0–0.9)
MONOCYTES NFR BLD AUTO: 6.3 % — SIGNIFICANT CHANGE UP (ref 2–14)
NEUTROPHILS # BLD AUTO: 3.59 K/UL — SIGNIFICANT CHANGE UP (ref 1.8–7.4)
NEUTROPHILS NFR BLD AUTO: 57.9 % — SIGNIFICANT CHANGE UP (ref 43–77)
NITRITE UR-MCNC: NEGATIVE — SIGNIFICANT CHANGE UP
NRBC # BLD AUTO: 0 K/UL — SIGNIFICANT CHANGE UP (ref 0–0)
NRBC # BLD: 0 /100 WBCS — SIGNIFICANT CHANGE UP (ref 0–0)
NRBC # FLD: 0 K/UL — SIGNIFICANT CHANGE UP (ref 0–0)
NRBC BLD-RTO: 0 /100 WBCS — SIGNIFICANT CHANGE UP (ref 0–0)
OPIATES UR-MCNC: NEGATIVE — SIGNIFICANT CHANGE UP
OXYCODONE UR-MCNC: NEGATIVE — SIGNIFICANT CHANGE UP
PCP SPEC-MCNC: SIGNIFICANT CHANGE UP
PCP UR-MCNC: NEGATIVE — SIGNIFICANT CHANGE UP
PH UR: 6 — SIGNIFICANT CHANGE UP (ref 5–8)
PLATELET # BLD AUTO: 284 K/UL — SIGNIFICANT CHANGE UP (ref 150–400)
POTASSIUM SERPL-MCNC: 4.3 MMOL/L — SIGNIFICANT CHANGE UP (ref 3.5–5.3)
POTASSIUM SERPL-SCNC: 4.3 MMOL/L — SIGNIFICANT CHANGE UP (ref 3.5–5.3)
PROT SERPL-MCNC: 7.4 G/DL — SIGNIFICANT CHANGE UP (ref 6–8.3)
PROT UR-MCNC: NEGATIVE MG/DL — SIGNIFICANT CHANGE UP
RBC # BLD: 5.33 M/UL — SIGNIFICANT CHANGE UP (ref 4.2–5.8)
RBC # FLD: 12.1 % — SIGNIFICANT CHANGE UP (ref 10.3–14.5)
SALICYLATES SERPL-MCNC: <0.3 MG/DL — LOW (ref 15–30)
SARS-COV-2 RNA SPEC QL NAA+PROBE: SIGNIFICANT CHANGE UP
SODIUM SERPL-SCNC: 138 MMOL/L — SIGNIFICANT CHANGE UP (ref 135–145)
SP GR SPEC: 1.04 — HIGH (ref 1–1.03)
THC UR QL: NEGATIVE — SIGNIFICANT CHANGE UP
TOXICOLOGY SCREEN, DRUGS OF ABUSE, SERUM RESULT: SIGNIFICANT CHANGE UP
TSH SERPL-MCNC: 0.87 UIU/ML — SIGNIFICANT CHANGE UP (ref 0.27–4.2)
UROBILINOGEN FLD QL: 0.2 MG/DL — SIGNIFICANT CHANGE UP (ref 0.2–1)
WBC # BLD: 6.19 K/UL — SIGNIFICANT CHANGE UP (ref 3.8–10.5)
WBC # FLD AUTO: 6.19 K/UL — SIGNIFICANT CHANGE UP (ref 3.8–10.5)

## 2024-07-25 PROCEDURE — 93010 ELECTROCARDIOGRAM REPORT: CPT

## 2024-07-25 PROCEDURE — 70450 CT HEAD/BRAIN W/O DYE: CPT | Mod: 26,MC

## 2024-07-25 PROCEDURE — 90792 PSYCH DIAG EVAL W/MED SRVCS: CPT

## 2024-07-25 PROCEDURE — 99285 EMERGENCY DEPT VISIT HI MDM: CPT

## 2024-07-25 RX ORDER — INSULIN LISPRO 100 U/ML
INJECTION, SOLUTION INTRAVENOUS; SUBCUTANEOUS AT BEDTIME
Refills: 0 | Status: DISCONTINUED | OUTPATIENT
Start: 2024-07-25 | End: 2024-07-28

## 2024-07-25 RX ORDER — SODIUM CHLORIDE 9 G/1000ML
1000 INJECTION, SOLUTION INTRAVENOUS
Refills: 0 | Status: DISCONTINUED | OUTPATIENT
Start: 2024-07-25 | End: 2024-07-28

## 2024-07-25 RX ORDER — INSULIN GLARGINE-YFGN 100 [IU]/ML
15 INJECTION, SOLUTION SUBCUTANEOUS AT BEDTIME
Refills: 0 | Status: DISCONTINUED | OUTPATIENT
Start: 2024-07-25 | End: 2024-07-28

## 2024-07-25 RX ORDER — DEXTROSE 50 % IN WATER 50 %
25 SYRINGE (ML) INTRAVENOUS ONCE
Refills: 0 | Status: ACTIVE | OUTPATIENT
Start: 2024-07-25

## 2024-07-25 RX ORDER — DEXTROSE 50 % IN WATER 50 %
15 SYRINGE (ML) INTRAVENOUS ONCE
Refills: 0 | Status: DISCONTINUED | OUTPATIENT
Start: 2024-07-25 | End: 2024-07-28

## 2024-07-25 RX ORDER — DEXTROSE 50 % IN WATER 50 %
12.5 SYRINGE (ML) INTRAVENOUS ONCE
Refills: 0 | Status: ACTIVE | OUTPATIENT
Start: 2024-07-25

## 2024-07-25 RX ORDER — INSULIN LISPRO 100 U/ML
INJECTION, SOLUTION INTRAVENOUS; SUBCUTANEOUS
Refills: 0 | Status: DISCONTINUED | OUTPATIENT
Start: 2024-07-25 | End: 2024-07-28

## 2024-07-25 RX ORDER — GLUCAGON 3 MG/1
1 POWDER NASAL ONCE
Refills: 0 | Status: DISCONTINUED | OUTPATIENT
Start: 2024-07-25 | End: 2024-07-28

## 2024-11-08 PROBLEM — Z00.00 ENCOUNTER FOR PREVENTIVE HEALTH EXAMINATION: Status: ACTIVE | Noted: 2024-11-08

## 2024-11-12 ENCOUNTER — APPOINTMENT (OUTPATIENT)
Dept: NEUROLOGY | Facility: CLINIC | Age: 64
End: 2024-11-12
Payer: COMMERCIAL

## 2024-11-12 VITALS
DIASTOLIC BLOOD PRESSURE: 79 MMHG | SYSTOLIC BLOOD PRESSURE: 161 MMHG | WEIGHT: 132 LBS | HEART RATE: 96 BPM | HEIGHT: 65 IN | BODY MASS INDEX: 21.99 KG/M2

## 2024-11-12 DIAGNOSIS — Z86.39 PERSONAL HISTORY OF OTHER ENDOCRINE, NUTRITIONAL AND METABOLIC DISEASE: ICD-10-CM

## 2024-11-12 DIAGNOSIS — H53.469 HOMONYMOUS BILATERAL FIELD DEFECTS, UNSPECIFIED SIDE: ICD-10-CM

## 2024-11-12 PROCEDURE — 99245 OFF/OP CONSLTJ NEW/EST HI 55: CPT

## 2024-11-12 RX ORDER — ATORVASTATIN CALCIUM 40 MG/1
40 TABLET, FILM COATED ORAL
Refills: 0 | Status: ACTIVE | COMMUNITY

## 2024-11-12 RX ORDER — METFORMIN HYDROCHLORIDE 625 MG/1
TABLET ORAL
Refills: 0 | Status: ACTIVE | COMMUNITY

## 2024-11-12 RX ORDER — INSULIN GLARGINE 100 [IU]/ML
INJECTION, SOLUTION SUBCUTANEOUS
Refills: 0 | Status: ACTIVE | COMMUNITY

## 2024-11-12 RX ORDER — KRILL/OM-3/DHA/EPA/PHOSPHO/AST 1000-230MG
81 CAPSULE ORAL
Refills: 0 | Status: ACTIVE | COMMUNITY

## 2024-11-12 RX ORDER — INSULIN LISPRO 100 [IU]/ML
100 INJECTION, SOLUTION INTRAVENOUS; SUBCUTANEOUS
Refills: 0 | Status: ACTIVE | COMMUNITY

## 2024-11-22 ENCOUNTER — APPOINTMENT (OUTPATIENT)
Dept: NEUROLOGY | Facility: CLINIC | Age: 64
End: 2024-11-22

## 2025-02-05 ENCOUNTER — APPOINTMENT (OUTPATIENT)
Dept: NEUROLOGY | Facility: CLINIC | Age: 65
End: 2025-02-05